# Patient Record
Sex: FEMALE | Race: WHITE | Employment: UNEMPLOYED | URBAN - METROPOLITAN AREA
[De-identification: names, ages, dates, MRNs, and addresses within clinical notes are randomized per-mention and may not be internally consistent; named-entity substitution may affect disease eponyms.]

---

## 2017-01-23 ENCOUNTER — HOSPITAL ENCOUNTER (OUTPATIENT)
Dept: SURGERY | Age: 52
Discharge: HOME | End: 2017-01-23
Attending: INTERNAL MEDICINE | Admitting: INTERNAL MEDICINE

## 2017-01-23 VITALS
TEMPERATURE: 97.7 F | SYSTOLIC BLOOD PRESSURE: 144 MMHG | DIASTOLIC BLOOD PRESSURE: 99 MMHG | WEIGHT: 155 LBS | OXYGEN SATURATION: 100 % | HEART RATE: 80 BPM | BODY MASS INDEX: 22.19 KG/M2 | HEIGHT: 70 IN | RESPIRATION RATE: 12 BRPM

## 2017-01-23 DIAGNOSIS — R13.14 DYSPHAGIA, PHARYNGOESOPHAGEAL PHASE: ICD-10-CM

## 2017-01-23 LAB
GLUCOSE BLD-MCNC: 185 MG/DL (ref 70–99)
GLUCOSE BLD-MCNC: 210 MG/DL (ref 70–99)
PERFORMED ON: ABNORMAL
PERFORMED ON: ABNORMAL
PREGNANCY, URINE: NEGATIVE

## 2017-01-23 RX ORDER — SODIUM CHLORIDE 0.9 % (FLUSH) 0.9 %
10 SYRINGE (ML) INJECTION EVERY 12 HOURS SCHEDULED
Status: DISCONTINUED | OUTPATIENT
Start: 2017-01-23 | End: 2017-01-24 | Stop reason: HOSPADM

## 2017-01-23 RX ORDER — LIDOCAINE HYDROCHLORIDE 10 MG/ML
1 INJECTION, SOLUTION EPIDURAL; INFILTRATION; INTRACAUDAL; PERINEURAL ONCE
Status: DISCONTINUED | OUTPATIENT
Start: 2017-01-23 | End: 2017-01-24 | Stop reason: HOSPADM

## 2017-01-23 RX ORDER — SODIUM CHLORIDE, SODIUM LACTATE, POTASSIUM CHLORIDE, CALCIUM CHLORIDE 600; 310; 30; 20 MG/100ML; MG/100ML; MG/100ML; MG/100ML
INJECTION, SOLUTION INTRAVENOUS CONTINUOUS
Status: DISCONTINUED | OUTPATIENT
Start: 2017-01-23 | End: 2017-01-24 | Stop reason: HOSPADM

## 2017-01-23 RX ORDER — SODIUM CHLORIDE, SODIUM LACTATE, POTASSIUM CHLORIDE, CALCIUM CHLORIDE 600; 310; 30; 20 MG/100ML; MG/100ML; MG/100ML; MG/100ML
1000 INJECTION, SOLUTION INTRAVENOUS ONCE
Status: DISCONTINUED | OUTPATIENT
Start: 2017-01-23 | End: 2017-01-24 | Stop reason: HOSPADM

## 2017-01-23 RX ORDER — LIDOCAINE HYDROCHLORIDE 10 MG/ML
1 INJECTION, SOLUTION EPIDURAL; INFILTRATION; INTRACAUDAL; PERINEURAL
Status: ACTIVE | OUTPATIENT
Start: 2017-01-23 | End: 2017-01-23

## 2017-01-23 RX ORDER — SODIUM CHLORIDE 0.9 % (FLUSH) 0.9 %
10 SYRINGE (ML) INJECTION PRN
Status: DISCONTINUED | OUTPATIENT
Start: 2017-01-23 | End: 2017-01-24 | Stop reason: HOSPADM

## 2017-01-23 ASSESSMENT — PAIN SCALES - GENERAL: PAINLEVEL_OUTOF10: 0

## 2017-01-23 ASSESSMENT — PAIN - FUNCTIONAL ASSESSMENT: PAIN_FUNCTIONAL_ASSESSMENT: 0-10

## 2017-03-14 ENCOUNTER — HOSPITAL ENCOUNTER (OUTPATIENT)
Dept: ULTRASOUND IMAGING | Age: 52
Discharge: HOME | End: 2017-03-14
Attending: INTERNAL MEDICINE | Admitting: INTERNAL MEDICINE

## 2017-03-14 DIAGNOSIS — B16.9 ACUTE VIRAL HEPATITIS B WITHOUT COMA AND WITHOUT DELTA AGENT: ICD-10-CM

## 2017-03-22 ENCOUNTER — HOSPITAL ENCOUNTER (OUTPATIENT)
Dept: ULTRASOUND IMAGING | Age: 52
Discharge: HOME | End: 2017-03-22
Attending: INTERNAL MEDICINE | Admitting: INTERNAL MEDICINE

## 2017-03-22 DIAGNOSIS — B16.9 ACUTE VIRAL HEPATITIS B WITHOUT COMA AND WITHOUT DELTA AGENT: ICD-10-CM

## 2017-03-22 DIAGNOSIS — B16.9 ACUTE HEPATITIS B: ICD-10-CM

## 2017-12-07 ENCOUNTER — HOSPITAL ENCOUNTER (OUTPATIENT)
Dept: OTHER | Age: 52
Discharge: HOME | End: 2017-12-07
Attending: INTERNAL MEDICINE | Admitting: INTERNAL MEDICINE

## 2017-12-07 DIAGNOSIS — R05.9 COUGH: ICD-10-CM

## 2017-12-07 LAB
A/G RATIO: 0.8 (ref 1.1–2.2)
ALBUMIN SERPL-MCNC: 3.8 G/DL (ref 3.4–5)
ALP BLD-CCNC: 105 U/L (ref 40–129)
ALT SERPL-CCNC: 112 U/L (ref 10–40)
ANION GAP SERPL CALCULATED.3IONS-SCNC: 15 MMOL/L (ref 3–16)
AST SERPL-CCNC: 98 U/L (ref 15–37)
BILIRUB SERPL-MCNC: 0.8 MG/DL (ref 0–1)
BUN BLDV-MCNC: 12 MG/DL (ref 7–20)
CALCIUM SERPL-MCNC: 9.6 MG/DL (ref 8.3–10.6)
CHLORIDE BLD-SCNC: 102 MMOL/L (ref 99–110)
CHOLESTEROL, FASTING: 192 MG/DL (ref 0–199)
CO2: 26 MMOL/L (ref 21–32)
CORTISOL TOTAL: 16.4 UG/DL
CREAT SERPL-MCNC: <0.5 MG/DL (ref 0.6–1.1)
GFR AFRICAN AMERICAN: >60
GFR NON-AFRICAN AMERICAN: >60
GLOBULIN: 4.5 G/DL
GLUCOSE FASTING: 114 MG/DL (ref 70–99)
HCT VFR BLD CALC: 43.7 % (ref 36–48)
HDLC SERPL-MCNC: 51 MG/DL (ref 40–60)
HEMOGLOBIN: 14.8 G/DL (ref 12–16)
LDL CHOLESTEROL CALCULATED: 122 MG/DL
MCH RBC QN AUTO: 32.1 PG (ref 26–34)
MCHC RBC AUTO-ENTMCNC: 34 G/DL (ref 31–36)
MCV RBC AUTO: 94.5 FL (ref 80–100)
PDW BLD-RTO: 13.8 % (ref 12.4–15.4)
PLATELET # BLD: 150 K/UL (ref 135–450)
PMV BLD AUTO: 10.4 FL (ref 5–10.5)
POTASSIUM SERPL-SCNC: 4.5 MMOL/L (ref 3.5–5.1)
RBC # BLD: 4.62 M/UL (ref 4–5.2)
SEDIMENTATION RATE, ERYTHROCYTE: 37 MM/HR (ref 0–30)
SODIUM BLD-SCNC: 143 MMOL/L (ref 136–145)
T3 FREE: 3 PG/ML (ref 2.3–4.2)
TOTAL PROTEIN: 8.3 G/DL (ref 6.4–8.2)
TRIGLYCERIDE, FASTING: 94 MG/DL (ref 0–150)
TSH SERPL DL<=0.05 MIU/L-ACNC: 2.97 UIU/ML (ref 0.27–4.2)
VITAMIN B-12: 888 PG/ML (ref 211–911)
VITAMIN D 25-HYDROXY: 35.7 NG/ML
VLDLC SERPL CALC-MCNC: 19 MG/DL
WBC # BLD: 6.1 K/UL (ref 4–11)

## 2017-12-08 LAB
ESTIMATED AVERAGE GLUCOSE: 197.3 MG/DL
HBA1C MFR BLD: 8.5 %

## 2019-01-07 ENCOUNTER — OFFICE VISIT (OUTPATIENT)
Dept: ORTHOPEDIC SURGERY | Age: 54
End: 2019-01-07
Payer: MEDICAID

## 2019-01-07 VITALS
HEIGHT: 70 IN | DIASTOLIC BLOOD PRESSURE: 84 MMHG | SYSTOLIC BLOOD PRESSURE: 128 MMHG | WEIGHT: 139.99 LBS | HEART RATE: 71 BPM | BODY MASS INDEX: 20.04 KG/M2

## 2019-01-07 DIAGNOSIS — M79.641 HAND PAIN, RIGHT: Primary | ICD-10-CM

## 2019-01-07 DIAGNOSIS — M65.311 TRIGGER FINGER OF RIGHT THUMB: ICD-10-CM

## 2019-01-07 PROCEDURE — G8427 DOCREV CUR MEDS BY ELIG CLIN: HCPCS | Performed by: ORTHOPAEDIC SURGERY

## 2019-01-07 PROCEDURE — 3017F COLORECTAL CA SCREEN DOC REV: CPT | Performed by: ORTHOPAEDIC SURGERY

## 2019-01-07 PROCEDURE — G8420 CALC BMI NORM PARAMETERS: HCPCS | Performed by: ORTHOPAEDIC SURGERY

## 2019-01-07 PROCEDURE — 99243 OFF/OP CNSLTJ NEW/EST LOW 30: CPT | Performed by: ORTHOPAEDIC SURGERY

## 2019-01-07 PROCEDURE — G8484 FLU IMMUNIZE NO ADMIN: HCPCS | Performed by: ORTHOPAEDIC SURGERY

## 2019-01-10 ENCOUNTER — TELEPHONE (OUTPATIENT)
Dept: ORTHOPEDIC SURGERY | Age: 54
End: 2019-01-10

## 2019-01-16 ENCOUNTER — ANESTHESIA EVENT (OUTPATIENT)
Dept: OPERATING ROOM | Age: 54
End: 2019-01-16
Payer: MEDICAID

## 2019-01-17 ENCOUNTER — ANESTHESIA (OUTPATIENT)
Dept: OPERATING ROOM | Age: 54
End: 2019-01-17
Payer: MEDICAID

## 2019-01-17 ENCOUNTER — HOSPITAL ENCOUNTER (OUTPATIENT)
Age: 54
Setting detail: OUTPATIENT SURGERY
Discharge: HOME | End: 2019-01-17
Attending: ORTHOPAEDIC SURGERY | Admitting: ORTHOPAEDIC SURGERY
Payer: MEDICAID

## 2019-01-17 VITALS
HEIGHT: 70 IN | SYSTOLIC BLOOD PRESSURE: 104 MMHG | BODY MASS INDEX: 20.33 KG/M2 | WEIGHT: 142 LBS | OXYGEN SATURATION: 99 % | TEMPERATURE: 97.4 F | RESPIRATION RATE: 15 BRPM | DIASTOLIC BLOOD PRESSURE: 73 MMHG | HEART RATE: 64 BPM

## 2019-01-17 VITALS
DIASTOLIC BLOOD PRESSURE: 55 MMHG | SYSTOLIC BLOOD PRESSURE: 89 MMHG | OXYGEN SATURATION: 99 % | TEMPERATURE: 98 F | RESPIRATION RATE: 1 BRPM

## 2019-01-17 DIAGNOSIS — M65.311 TRIGGER FINGER OF RIGHT THUMB: Primary | ICD-10-CM

## 2019-01-17 LAB
GLUCOSE BLD-MCNC: 210 MG/DL (ref 70–99)
PERFORMED ON: ABNORMAL

## 2019-01-17 PROCEDURE — 2500000003 HC RX 250 WO HCPCS: Performed by: NURSE ANESTHETIST, CERTIFIED REGISTERED

## 2019-01-17 PROCEDURE — 2580000003 HC RX 258: Performed by: NURSE ANESTHETIST, CERTIFIED REGISTERED

## 2019-01-17 PROCEDURE — 6360000002 HC RX W HCPCS: Performed by: ORTHOPAEDIC SURGERY

## 2019-01-17 PROCEDURE — 3600000012 HC SURGERY LEVEL 2 ADDTL 15MIN: Performed by: ORTHOPAEDIC SURGERY

## 2019-01-17 PROCEDURE — 2580000003 HC RX 258: Performed by: ANESTHESIOLOGY

## 2019-01-17 PROCEDURE — 2500000003 HC RX 250 WO HCPCS: Performed by: ANESTHESIOLOGY

## 2019-01-17 PROCEDURE — 2500000003 HC RX 250 WO HCPCS: Performed by: ORTHOPAEDIC SURGERY

## 2019-01-17 PROCEDURE — 3600000002 HC SURGERY LEVEL 2 BASE: Performed by: ORTHOPAEDIC SURGERY

## 2019-01-17 PROCEDURE — 3700000001 HC ADD 15 MINUTES (ANESTHESIA): Performed by: ORTHOPAEDIC SURGERY

## 2019-01-17 PROCEDURE — 7100000011 HC PHASE II RECOVERY - ADDTL 15 MIN: Performed by: ORTHOPAEDIC SURGERY

## 2019-01-17 PROCEDURE — 6360000002 HC RX W HCPCS: Performed by: NURSE ANESTHETIST, CERTIFIED REGISTERED

## 2019-01-17 PROCEDURE — 2709999900 HC NON-CHARGEABLE SUPPLY: Performed by: ORTHOPAEDIC SURGERY

## 2019-01-17 PROCEDURE — 3700000000 HC ANESTHESIA ATTENDED CARE: Performed by: ORTHOPAEDIC SURGERY

## 2019-01-17 PROCEDURE — 7100000010 HC PHASE II RECOVERY - FIRST 15 MIN: Performed by: ORTHOPAEDIC SURGERY

## 2019-01-17 PROCEDURE — 7100000001 HC PACU RECOVERY - ADDTL 15 MIN: Performed by: ORTHOPAEDIC SURGERY

## 2019-01-17 PROCEDURE — 7100000000 HC PACU RECOVERY - FIRST 15 MIN: Performed by: ORTHOPAEDIC SURGERY

## 2019-01-17 RX ORDER — FENTANYL CITRATE 50 UG/ML
25 INJECTION, SOLUTION INTRAMUSCULAR; INTRAVENOUS EVERY 5 MIN PRN
Status: DISCONTINUED | OUTPATIENT
Start: 2019-01-17 | End: 2019-01-17 | Stop reason: HOSPADM

## 2019-01-17 RX ORDER — FENTANYL CITRATE 50 UG/ML
INJECTION, SOLUTION INTRAMUSCULAR; INTRAVENOUS PRN
Status: DISCONTINUED | OUTPATIENT
Start: 2019-01-17 | End: 2019-01-17 | Stop reason: SDUPTHER

## 2019-01-17 RX ORDER — BUPIVACAINE HYDROCHLORIDE 2.5 MG/ML
INJECTION, SOLUTION EPIDURAL; INFILTRATION; INTRACAUDAL
Status: DISCONTINUED
Start: 2019-01-17 | End: 2019-01-17 | Stop reason: HOSPADM

## 2019-01-17 RX ORDER — ONDANSETRON 2 MG/ML
INJECTION INTRAMUSCULAR; INTRAVENOUS PRN
Status: DISCONTINUED | OUTPATIENT
Start: 2019-01-17 | End: 2019-01-17 | Stop reason: SDUPTHER

## 2019-01-17 RX ORDER — OXYCODONE HYDROCHLORIDE AND ACETAMINOPHEN 5; 325 MG/1; MG/1
1 TABLET ORAL
Status: DISCONTINUED | OUTPATIENT
Start: 2019-01-17 | End: 2019-01-17 | Stop reason: HOSPADM

## 2019-01-17 RX ORDER — PROMETHAZINE HYDROCHLORIDE 25 MG/ML
6.25 INJECTION, SOLUTION INTRAMUSCULAR; INTRAVENOUS
Status: DISCONTINUED | OUTPATIENT
Start: 2019-01-17 | End: 2019-01-17 | Stop reason: HOSPADM

## 2019-01-17 RX ORDER — PROPOFOL 10 MG/ML
INJECTION, EMULSION INTRAVENOUS PRN
Status: DISCONTINUED | OUTPATIENT
Start: 2019-01-17 | End: 2019-01-17 | Stop reason: SDUPTHER

## 2019-01-17 RX ORDER — LEVOTHYROXINE SODIUM 88 UG/1
88 TABLET ORAL DAILY
COMMUNITY
End: 2021-01-02

## 2019-01-17 RX ORDER — CEFAZOLIN SODIUM 2 G/50ML
2 SOLUTION INTRAVENOUS ONCE
Status: COMPLETED | OUTPATIENT
Start: 2019-01-17 | End: 2019-01-17

## 2019-01-17 RX ORDER — OXYCODONE HYDROCHLORIDE AND ACETAMINOPHEN 5; 325 MG/1; MG/1
1 TABLET ORAL EVERY 8 HOURS PRN
Qty: 21 TABLET | Refills: 0 | Status: SHIPPED | OUTPATIENT
Start: 2019-01-17 | End: 2019-01-24

## 2019-01-17 RX ORDER — LIDOCAINE HYDROCHLORIDE 10 MG/ML
INJECTION, SOLUTION EPIDURAL; INFILTRATION; INTRACAUDAL; PERINEURAL
Status: DISCONTINUED
Start: 2019-01-17 | End: 2019-01-17 | Stop reason: HOSPADM

## 2019-01-17 RX ORDER — HYDRALAZINE HYDROCHLORIDE 20 MG/ML
5 INJECTION INTRAMUSCULAR; INTRAVENOUS EVERY 10 MIN PRN
Status: DISCONTINUED | OUTPATIENT
Start: 2019-01-17 | End: 2019-01-17 | Stop reason: HOSPADM

## 2019-01-17 RX ORDER — ONDANSETRON 2 MG/ML
4 INJECTION INTRAMUSCULAR; INTRAVENOUS
Status: DISCONTINUED | OUTPATIENT
Start: 2019-01-17 | End: 2019-01-17 | Stop reason: HOSPADM

## 2019-01-17 RX ORDER — LIDOCAINE HYDROCHLORIDE 20 MG/ML
INJECTION, SOLUTION INFILTRATION; PERINEURAL PRN
Status: DISCONTINUED | OUTPATIENT
Start: 2019-01-17 | End: 2019-01-17 | Stop reason: SDUPTHER

## 2019-01-17 RX ORDER — SODIUM CHLORIDE, SODIUM LACTATE, POTASSIUM CHLORIDE, CALCIUM CHLORIDE 600; 310; 30; 20 MG/100ML; MG/100ML; MG/100ML; MG/100ML
INJECTION, SOLUTION INTRAVENOUS CONTINUOUS PRN
Status: DISCONTINUED | OUTPATIENT
Start: 2019-01-17 | End: 2019-01-17 | Stop reason: SDUPTHER

## 2019-01-17 RX ORDER — LABETALOL HYDROCHLORIDE 5 MG/ML
5 INJECTION, SOLUTION INTRAVENOUS EVERY 10 MIN PRN
Status: DISCONTINUED | OUTPATIENT
Start: 2019-01-17 | End: 2019-01-17 | Stop reason: HOSPADM

## 2019-01-17 RX ORDER — FENTANYL CITRATE 50 UG/ML
50 INJECTION, SOLUTION INTRAMUSCULAR; INTRAVENOUS EVERY 5 MIN PRN
Status: DISCONTINUED | OUTPATIENT
Start: 2019-01-17 | End: 2019-01-17 | Stop reason: HOSPADM

## 2019-01-17 RX ORDER — SODIUM CHLORIDE, SODIUM LACTATE, POTASSIUM CHLORIDE, CALCIUM CHLORIDE 600; 310; 30; 20 MG/100ML; MG/100ML; MG/100ML; MG/100ML
INJECTION, SOLUTION INTRAVENOUS CONTINUOUS
Status: DISCONTINUED | OUTPATIENT
Start: 2019-01-17 | End: 2019-01-17 | Stop reason: HOSPADM

## 2019-01-17 RX ADMIN — PROPOFOL 50 MG: 10 INJECTION, EMULSION INTRAVENOUS at 10:58

## 2019-01-17 RX ADMIN — SODIUM CHLORIDE, POTASSIUM CHLORIDE, SODIUM LACTATE AND CALCIUM CHLORIDE: 600; 310; 30; 20 INJECTION, SOLUTION INTRAVENOUS at 08:44

## 2019-01-17 RX ADMIN — CEFAZOLIN SODIUM 2 G: 2 SOLUTION INTRAVENOUS at 10:25

## 2019-01-17 RX ADMIN — LIDOCAINE HYDROCHLORIDE 0.1 ML: 10 INJECTION, SOLUTION EPIDURAL; INFILTRATION; INTRACAUDAL; PERINEURAL at 08:44

## 2019-01-17 RX ADMIN — FENTANYL CITRATE 50 MCG: 50 INJECTION INTRAMUSCULAR; INTRAVENOUS at 10:35

## 2019-01-17 RX ADMIN — PROPOFOL 80 MG: 10 INJECTION, EMULSION INTRAVENOUS at 10:43

## 2019-01-17 RX ADMIN — PROPOFOL 80 MG: 10 INJECTION, EMULSION INTRAVENOUS at 10:39

## 2019-01-17 RX ADMIN — SODIUM CHLORIDE, POTASSIUM CHLORIDE, SODIUM LACTATE AND CALCIUM CHLORIDE: 600; 310; 30; 20 INJECTION, SOLUTION INTRAVENOUS at 10:19

## 2019-01-17 RX ADMIN — CEFAZOLIN SODIUM 2 G: 2 SOLUTION INTRAVENOUS at 10:24

## 2019-01-17 RX ADMIN — PROPOFOL 80 MG: 10 INJECTION, EMULSION INTRAVENOUS at 10:35

## 2019-01-17 RX ADMIN — PROPOFOL 50 MG: 10 INJECTION, EMULSION INTRAVENOUS at 10:47

## 2019-01-17 RX ADMIN — PROPOFOL 50 MG: 10 INJECTION, EMULSION INTRAVENOUS at 10:52

## 2019-01-17 RX ADMIN — LIDOCAINE HYDROCHLORIDE 40 MG: 20 INJECTION, SOLUTION INFILTRATION; PERINEURAL at 10:35

## 2019-01-17 RX ADMIN — ONDANSETRON 4 MG: 2 INJECTION, SOLUTION INTRAMUSCULAR; INTRAVENOUS at 10:58

## 2019-01-17 ASSESSMENT — LIFESTYLE VARIABLES: SMOKING_STATUS: 1

## 2019-01-17 ASSESSMENT — PAIN - FUNCTIONAL ASSESSMENT
PAIN_FUNCTIONAL_ASSESSMENT: 0-10
PAIN_FUNCTIONAL_ASSESSMENT: PREVENTS OR INTERFERES SOME ACTIVE ACTIVITIES AND ADLS

## 2019-01-17 ASSESSMENT — PULMONARY FUNCTION TESTS
PIF_VALUE: 0

## 2019-01-17 ASSESSMENT — PAIN DESCRIPTION - DESCRIPTORS: DESCRIPTORS: ACHING

## 2019-01-17 ASSESSMENT — PAIN SCALES - GENERAL
PAINLEVEL_OUTOF10: 0
PAINLEVEL_OUTOF10: 0

## 2019-01-30 ENCOUNTER — OFFICE VISIT (OUTPATIENT)
Dept: ORTHOPEDIC SURGERY | Age: 54
End: 2019-01-30

## 2019-01-30 VITALS — WEIGHT: 141.98 LBS | BODY MASS INDEX: 20.33 KG/M2 | HEIGHT: 70 IN

## 2019-01-30 DIAGNOSIS — M65.311 TRIGGER FINGER OF RIGHT THUMB: Primary | ICD-10-CM

## 2019-01-30 PROCEDURE — 99024 POSTOP FOLLOW-UP VISIT: CPT | Performed by: ORTHOPAEDIC SURGERY

## 2019-02-18 ENCOUNTER — HOSPITAL ENCOUNTER (EMERGENCY)
Age: 54
Discharge: HOME | End: 2019-02-18
Payer: MEDICAID

## 2019-02-18 VITALS
BODY MASS INDEX: 21.33 KG/M2 | HEART RATE: 85 BPM | RESPIRATION RATE: 18 BRPM | WEIGHT: 149 LBS | OXYGEN SATURATION: 100 % | HEIGHT: 70 IN | TEMPERATURE: 97.7 F | SYSTOLIC BLOOD PRESSURE: 137 MMHG | DIASTOLIC BLOOD PRESSURE: 82 MMHG

## 2019-02-18 DIAGNOSIS — J01.00 ACUTE MAXILLARY SINUSITIS, RECURRENCE NOT SPECIFIED: Primary | ICD-10-CM

## 2019-02-18 DIAGNOSIS — K04.7 DENTAL INFECTION: ICD-10-CM

## 2019-02-18 PROCEDURE — 99282 EMERGENCY DEPT VISIT SF MDM: CPT

## 2019-02-18 PROCEDURE — 6370000000 HC RX 637 (ALT 250 FOR IP): Performed by: PHYSICIAN ASSISTANT

## 2019-02-18 RX ORDER — IBUPROFEN 800 MG/1
800 TABLET ORAL EVERY 8 HOURS PRN
Qty: 30 TABLET | Refills: 0 | Status: SHIPPED | OUTPATIENT
Start: 2019-02-18 | End: 2021-01-02

## 2019-02-18 RX ORDER — CLINDAMYCIN HYDROCHLORIDE 150 MG/1
300 CAPSULE ORAL ONCE
Status: COMPLETED | OUTPATIENT
Start: 2019-02-18 | End: 2019-02-18

## 2019-02-18 RX ORDER — CLINDAMYCIN HYDROCHLORIDE 300 MG/1
300 CAPSULE ORAL 3 TIMES DAILY
Qty: 21 CAPSULE | Refills: 0 | Status: SHIPPED | OUTPATIENT
Start: 2019-02-18 | End: 2019-02-25

## 2019-02-18 RX ADMIN — CLINDAMYCIN HYDROCHLORIDE 300 MG: 150 CAPSULE ORAL at 19:44

## 2019-02-18 ASSESSMENT — PAIN SCALES - GENERAL: PAINLEVEL_OUTOF10: 5

## 2019-02-18 ASSESSMENT — PAIN DESCRIPTION - PAIN TYPE: TYPE: ACUTE PAIN

## 2019-02-21 ASSESSMENT — ENCOUNTER SYMPTOMS
FACIAL SWELLING: 0
NAUSEA: 0
BACK PAIN: 0
EYE PAIN: 0
RHINORRHEA: 0
DIARRHEA: 0
SINUS PRESSURE: 1
SINUS PAIN: 1
EYE REDNESS: 0
SORE THROAT: 0
SHORTNESS OF BREATH: 0
COUGH: 0
CHEST TIGHTNESS: 0
CONSTIPATION: 0
ABDOMINAL PAIN: 0

## 2019-03-26 ENCOUNTER — HOSPITAL ENCOUNTER (OUTPATIENT)
Age: 54
Discharge: HOME | End: 2019-03-26
Payer: MEDICAID

## 2019-03-26 LAB
A/G RATIO: 0.5 (ref 1.1–2.2)
ALBUMIN SERPL-MCNC: 3.2 G/DL (ref 3.4–5)
ALP BLD-CCNC: 236 U/L (ref 40–129)
ALT SERPL-CCNC: 101 U/L (ref 10–40)
ANION GAP SERPL CALCULATED.3IONS-SCNC: 10 MMOL/L (ref 3–16)
AST SERPL-CCNC: 107 U/L (ref 15–37)
BILIRUB SERPL-MCNC: 0.6 MG/DL (ref 0–1)
BUN BLDV-MCNC: 10 MG/DL (ref 7–20)
CALCIUM SERPL-MCNC: 9.5 MG/DL (ref 8.3–10.6)
CHLORIDE BLD-SCNC: 99 MMOL/L (ref 99–110)
CHOLESTEROL, TOTAL: 204 MG/DL (ref 0–199)
CO2: 27 MMOL/L (ref 21–32)
CREAT SERPL-MCNC: <0.5 MG/DL (ref 0.6–1.1)
GFR AFRICAN AMERICAN: >60
GFR NON-AFRICAN AMERICAN: >60
GLOBULIN: 5.9 G/DL
GLUCOSE BLD-MCNC: 336 MG/DL (ref 70–99)
HCT VFR BLD CALC: 40.3 % (ref 36–48)
HDLC SERPL-MCNC: 62 MG/DL (ref 40–60)
HEMOGLOBIN: 13.7 G/DL (ref 12–16)
LDL CHOLESTEROL CALCULATED: 118 MG/DL
MCH RBC QN AUTO: 32 PG (ref 26–34)
MCHC RBC AUTO-ENTMCNC: 34.1 G/DL (ref 31–36)
MCV RBC AUTO: 94 FL (ref 80–100)
PDW BLD-RTO: 13.3 % (ref 12.4–15.4)
PLATELET # BLD: 135 K/UL (ref 135–450)
PMV BLD AUTO: 9.5 FL (ref 5–10.5)
POTASSIUM SERPL-SCNC: 4.8 MMOL/L (ref 3.5–5.1)
RBC # BLD: 4.29 M/UL (ref 4–5.2)
SODIUM BLD-SCNC: 136 MMOL/L (ref 136–145)
TOTAL PROTEIN: 9.1 G/DL (ref 6.4–8.2)
TRIGL SERPL-MCNC: 118 MG/DL (ref 0–150)
TSH SERPL DL<=0.05 MIU/L-ACNC: 6.59 UIU/ML (ref 0.27–4.2)
VITAMIN B-12: 695 PG/ML (ref 211–911)
VITAMIN D 25-HYDROXY: 23.5 NG/ML
VLDLC SERPL CALC-MCNC: 24 MG/DL
WBC # BLD: 5.6 K/UL (ref 4–11)

## 2019-03-26 PROCEDURE — 82607 VITAMIN B-12: CPT

## 2019-03-26 PROCEDURE — 36415 COLL VENOUS BLD VENIPUNCTURE: CPT

## 2019-03-26 PROCEDURE — 85027 COMPLETE CBC AUTOMATED: CPT

## 2019-03-26 PROCEDURE — 80061 LIPID PANEL: CPT

## 2019-03-26 PROCEDURE — 84443 ASSAY THYROID STIM HORMONE: CPT

## 2019-03-26 PROCEDURE — 82306 VITAMIN D 25 HYDROXY: CPT

## 2019-03-26 PROCEDURE — 83036 HEMOGLOBIN GLYCOSYLATED A1C: CPT

## 2019-03-26 PROCEDURE — 80053 COMPREHEN METABOLIC PANEL: CPT

## 2019-03-27 LAB
ESTIMATED AVERAGE GLUCOSE: 237.4 MG/DL
HBA1C MFR BLD: 9.9 %

## 2019-05-13 ENCOUNTER — HOSPITAL ENCOUNTER (EMERGENCY)
Age: 54
Discharge: HOME | End: 2019-05-13
Payer: MEDICAID

## 2019-05-13 VITALS
HEART RATE: 93 BPM | SYSTOLIC BLOOD PRESSURE: 132 MMHG | HEIGHT: 70 IN | RESPIRATION RATE: 12 BRPM | TEMPERATURE: 98.6 F | OXYGEN SATURATION: 100 % | DIASTOLIC BLOOD PRESSURE: 82 MMHG | WEIGHT: 150 LBS | BODY MASS INDEX: 21.47 KG/M2

## 2019-05-13 DIAGNOSIS — S46.911A STRAIN OF RIGHT SHOULDER, INITIAL ENCOUNTER: Primary | ICD-10-CM

## 2019-05-13 DIAGNOSIS — M25.511 ACUTE PAIN OF RIGHT SHOULDER: ICD-10-CM

## 2019-05-13 PROCEDURE — 6360000002 HC RX W HCPCS: Performed by: PHYSICIAN ASSISTANT

## 2019-05-13 PROCEDURE — 97110 THERAPEUTIC EXERCISES: CPT

## 2019-05-13 PROCEDURE — 96372 THER/PROPH/DIAG INJ SC/IM: CPT

## 2019-05-13 PROCEDURE — 99283 EMERGENCY DEPT VISIT LOW MDM: CPT

## 2019-05-13 PROCEDURE — 97162 PT EVAL MOD COMPLEX 30 MIN: CPT

## 2019-05-13 RX ORDER — NAPROXEN 500 MG/1
500 TABLET ORAL 2 TIMES DAILY
Qty: 20 TABLET | Refills: 0 | Status: SHIPPED | OUTPATIENT
Start: 2019-05-13 | End: 2021-01-02

## 2019-05-13 RX ORDER — KETOROLAC TROMETHAMINE 30 MG/ML
60 INJECTION, SOLUTION INTRAMUSCULAR; INTRAVENOUS ONCE
Status: COMPLETED | OUTPATIENT
Start: 2019-05-13 | End: 2019-05-13

## 2019-05-13 RX ORDER — GABAPENTIN 300 MG/1
300 CAPSULE ORAL PRN
COMMUNITY
End: 2021-01-02

## 2019-05-13 RX ORDER — LIDOCAINE 40 MG/G
CREAM TOPICAL
Qty: 15 G | Refills: 0 | Status: SHIPPED | OUTPATIENT
Start: 2019-05-13 | End: 2021-01-02

## 2019-05-13 RX ORDER — LIDOCAINE 50 MG/G
1 PATCH TOPICAL DAILY
Qty: 6 PATCH | Refills: 0 | Status: SHIPPED | OUTPATIENT
Start: 2019-05-13 | End: 2019-05-19

## 2019-05-13 RX ADMIN — KETOROLAC TROMETHAMINE 60 MG: 60 INJECTION, SOLUTION INTRAMUSCULAR at 17:10

## 2019-05-13 ASSESSMENT — PAIN DESCRIPTION - PAIN TYPE: TYPE: ACUTE PAIN

## 2019-05-13 ASSESSMENT — PAIN SCALES - GENERAL
PAINLEVEL_OUTOF10: 4
PAINLEVEL_OUTOF10: 5

## 2019-05-13 ASSESSMENT — PAIN DESCRIPTION - ORIENTATION: ORIENTATION: RIGHT

## 2019-05-13 ASSESSMENT — PAIN DESCRIPTION - LOCATION: LOCATION: SHOULDER

## 2019-05-13 NOTE — ED NOTES
Reviewed discharge instructions with pt, verbalized understanding,  Denies questions at this time. Ambulated off unit without assistance.       Alexander Mata RN  05/13/19 5877

## 2019-05-13 NOTE — ED NOTES
[]   L   []   Scapular winging  []   Other:       Palpation/Tenderness/Visual Inspection      Patient reported tenderness with palpation  [x]   Yes   []   No   []   NT  Location:   R proximal bicep tendon, R teres minor, R infraspinatous  PT notes warmth  []   Yes   [x]   No   []   NT  Location:   PT notes increased muscle tone   []   Yes   [x]   No   []   NT  Location:   Ligament tenderness/provocation:   []   Yes   []   No   [x]   NT  Location:      Range of Motion/Strength Testing/Myotomes    [x] All ROM and strength WNL except as marked below  *denotes pain  Range Tested AROM PROM Resisted/Myotomes    Left (or  neutral)  Right Left Right Left (or neutral) Right   Cervical Flex (C1-2)         Cervical Ext         Cervical SB (C3)         Cervical Rot         Shoulder Shrug (C4)         Shoulder Flex  75  120  2/5   Shoulder Ext  30    4-/5   Shoulder Abd (C5)  75  90  2/5   Shoulder Add         Shoulder IR  To top of hip bone  45 in 45 deg abd  2/5   Shoulder ER   Unable to reach back of head  45 in 45 deg abd  2/5   Elbow flex (C6)         Elbow ext (C7)         Wrist ext (C6)         Wrist flex (C7)         Supination          Pronation         Thumb Ext (C8)         Intrinsics (T1)             Scapula Strength     [x] All strength WNL except as marked below   Scapula Left Right Comments   Upper Trapezius  weakness noted    Middle Trapezius  \"    Lower Trapezius  \"    Rhomboid  \"    Serratus Anterior    \"    Latissimus Dorsi  \"        UE Special Tests    Test Right Left   Painful Arc [x]  Positive []   Neg []  Positive []   Neg   Empty Can [x]  Positive []   Neg []  Positive []   Neg   Drop Arm []  Positive [x]   Neg []  Positive []   Neg   Resisted ER []  Positive []   Neg []  Positive []   Neg       TREATMENT  Educated on anatomy, physiology, and biomechanics of R shoulder and RTC. Pt verbalized understanding and all of patient's questions answered to satisfaction. Business card and HEP issued.      Manual tx: none    Therex:  Instructed in HEP with handout given:  RUCHI Gaines shoulder flex, abd, IR/ER, table slides    Equipment:  none       Pt response to treatment:  Tolerated treatment well with no adverse reactions noted. ASSESSMENT  Pt presenting to ED with c/o R shoulder pain. Through evaluation and examination, identified limited ROM, limited strength, poor scapulohumeral rhythm, and decreased functional status with use of R shoulder. Patient was instructed in HEP with handout given. PT recommending referral to ortho and outpatient PT treatment. Discussed findings and recommendations with referring provider. PLAN OF CARE    One time visit in the ED. Thank you for the referral of this patient.      Timed Code Treatment Minutes:  15  minutes   Total Treatment Time:  35 minutes    Pb Berrios PT [id number]

## 2019-05-15 NOTE — ED PROVIDER NOTES
CHIEF COMPLAINT  Shoulder Pain (right shoulder pain for 1 year, \"I pulled a bunch of weeds while working in the garden and I cant even lift up my shoulder\")      HISTORY OF PRESENT ILLNESS  Jaquelin Saul is a 48 y.o. female who presents to the ED for evaluation of 2 years of chronic shoulder pain, worsened over the past day, after pulling weeds int he garden. No trauma or injury. +dominant arm. 10/10 throbbing pain. Worse with use. No other complaints, modifying factors or associated symptoms. Nursing notes reviewed.    Past Medical History:   Diagnosis Date    Depression     Diabetes mellitus ([facility] [address]     Hepatitis B surface antigen positive 11/22/2016    Neuropathy     Thyroid disease     Type II or unspecified type diabetes mellitus without mention of complication, not stated as uncontrolled      Past Surgical History:   Procedure Laterality Date    COLONOSCOPY  01/23/2017    polyp removal    FINGER TRIGGER RELEASE Right 1/17/2019    RIGHT THUMB TRIGGER DIGIT RELEASE performed by Jhonatan Aguilar MD at [facility]  age 10   [facility] UPPER GASTROINTESTINAL ENDOSCOPY  12/08/2016    UPPER GASTROINTESTINAL ENDOSCOPY  01/23/2017     Family History   Problem Relation Age of Onset    Diabetes Mother     High Blood Pressure Mother     Cancer Father     Diabetes Father     Lupus Sister     Cancer Sister     Heart Disease Brother      Social History     Socioeconomic History    Marital status: Single     Spouse name: Not on file    Number of children: Not on file    Years of education: Not on file    Highest education level: Not on file   Occupational History    Not on file   Social Needs    Financial resource strain: Not on file    Food insecurity:     Worry: Not on file     Inability: Not on file    Transportation needs:     Medical: Not on file     Non-medical: Not on file   Tobacco Use    Smoking status: Current Every Day Smoker Packs/day: 1.00     Types: Cigarettes    Smokeless tobacco: Never Used   Substance and Sexual Activity    Alcohol use: Yes     Comment: 5/mth    Drug use: Yes     Frequency: 3.0 times per week     Types: Marijuana     Comment: past week     Sexual activity: Yes     Partners: Male   Lifestyle    Physical activity:     Days per week: Not on file     Minutes per session: Not on file    Stress: Not on file   Relationships    Social connections:     Talks on phone: Not on file     Gets together: Not on file     Attends Religion service: Not on file     Active member of club or organization: Not on file     Attends meetings of clubs or organizations: Not on file     Relationship status: Not on file    Intimate partner violence:     Fear of current or ex partner: Not on file     Emotionally abused: Not on file     Physically abused: Not on file     Forced sexual activity: Not on file   Other Topics Concern    Not on file   Social History Narrative    ** Merged History Encounter **          No current facility-administered medications for this encounter. Current Outpatient Medications   Medication Sig Dispense Refill    gabapentin (NEURONTIN) 300 MG capsule Take 300 mg by mouth as needed.  naproxen (NAPROSYN) 500 MG tablet Take 1 tablet by mouth 2 times daily for 20 doses 20 tablet 0    lidocaine (LIDODERM) 5 % Place 1 patch onto the skin daily for 6 doses 12 hours on, 12 hours off. 6 patch 0    lidocaine (LMX) 4 % cream Apply topically as needed.  15 g 0    ibuprofen (ADVIL;MOTRIN) 800 MG tablet Take 1 tablet by mouth every 8 hours as needed for Pain 30 tablet 0    insulin glargine (BASAGLAR KWIKPEN) 100 UNIT/ML injection pen Inject 50 Units into the skin daily Pt states she takes in AM       levothyroxine (SYNTHROID) 88 MCG tablet Take 88 mcg by mouth Daily       Allergies   Allergen Reactions    Levaquin [Levofloxacin]      Tendons locked up    Penicillins Hives    Pcn [Penicillins] Rash had any recent trauma. Patient is advised to return to the ED for any new or worsening symptoms. Patient isto follow up with PCP in 2 Days. Patient was given scripts for the following medications. I counseled patient how to take these medications. Discharge Medication List as of 5/13/2019  4:48 PM      START taking these medications    Details   naproxen (NAPROSYN) 500 MG tablet Take 1 tablet by mouth 2 times daily for 20 doses, Disp-20 tablet, R-0Print      lidocaine (LIDODERM) 5 % Place 1 patch onto the skin daily for 6 doses 12 hours on, 12 hours off., Disp-6 patch, R-0Print      lidocaine (LMX) 4 % cream Apply topically as needed. , Disp-15 g, R-0, Print               No results found for this visit on 05/13/19. I estimate there is LOW risk for ACUTE CORONARY SYNDROME,INTRACRANIAL HEMORRHAGE, MALIGNANT DYSRHYTHMIA, MENINGITIS, PNEUMONIA, PULMONARY EMBOLISM, SEPSIS, SUBARACHNOID HEMORRHAGE, SUBDURAL HEMATOMA, STROKE, or URINARY TRACT INFECTION, thus I consider the discharge dispositionreasonable. Rosita Muñoz and I have discussed the diagnosis and risks, and we agree with discharging home to follow-up with their primary doctor. We also discussed returning to the Emergency Department immediately if new orworsening symptoms occur. We have discussed the symptoms which are most concerning (e.g., changing or worsening pain, weakness, vomiting, fever) that necessitate immediate return. CLINICAL IMPRESSION  1. Strain of right shoulder, initial encounter    2. Acute pain of right shoulder        Blood pressure 132/82, pulse 93, temperature 98.6 °F (37 °C), temperature source Tympanic, resp. rate 12, height 5' 10\" (1.778 m), weight 150 lb (68 kg), last menstrual period 12/06/2016, SpO2 100 %, not currently breastfeeding. DISPOSITION  Patient was discharged to home in good condition.         Gasper Conte PA-C  05/18/19 5468

## 2019-05-18 ASSESSMENT — ENCOUNTER SYMPTOMS
RESPIRATORY NEGATIVE: 1
SHORTNESS OF BREATH: 0
GASTROINTESTINAL NEGATIVE: 1
BACK PAIN: 0

## 2020-05-28 ENCOUNTER — HOSPITAL ENCOUNTER (EMERGENCY)
Age: 55
Discharge: HOME | End: 2020-05-29
Attending: EMERGENCY MEDICINE
Payer: MEDICAID

## 2020-05-28 LAB
A/G RATIO: 0.7 (ref 1.1–2.2)
ALBUMIN SERPL-MCNC: 3.5 G/DL (ref 3.4–5)
ALP BLD-CCNC: 164 U/L (ref 40–129)
ALT SERPL-CCNC: 129 U/L (ref 10–40)
AMPHETAMINE SCREEN, URINE: ABNORMAL
ANION GAP SERPL CALCULATED.3IONS-SCNC: 9 MMOL/L (ref 3–16)
AST SERPL-CCNC: 114 U/L (ref 15–37)
BACTERIA: ABNORMAL /HPF
BARBITURATE SCREEN URINE: ABNORMAL
BASOPHILS ABSOLUTE: 0 K/UL (ref 0–0.2)
BASOPHILS RELATIVE PERCENT: 0.9 %
BENZODIAZEPINE SCREEN, URINE: ABNORMAL
BILIRUB SERPL-MCNC: 0.8 MG/DL (ref 0–1)
BILIRUBIN URINE: ABNORMAL
BLOOD, URINE: ABNORMAL
BUN BLDV-MCNC: 12 MG/DL (ref 7–20)
CALCIUM SERPL-MCNC: 9.4 MG/DL (ref 8.3–10.6)
CANNABINOID SCREEN URINE: POSITIVE
CHLORIDE BLD-SCNC: 97 MMOL/L (ref 99–110)
CLARITY: CLEAR
CO2: 26 MMOL/L (ref 21–32)
COCAINE METABOLITE SCREEN URINE: ABNORMAL
COLOR: YELLOW
CREAT SERPL-MCNC: 0.6 MG/DL (ref 0.6–1.1)
EOSINOPHILS ABSOLUTE: 0.1 K/UL (ref 0–0.6)
EOSINOPHILS RELATIVE PERCENT: 2.3 %
ETHANOL: NORMAL MG/DL (ref 0–0.08)
GFR AFRICAN AMERICAN: >60
GFR NON-AFRICAN AMERICAN: >60
GLOBULIN: 5.2 G/DL
GLUCOSE BLD-MCNC: 310 MG/DL (ref 70–99)
GLUCOSE URINE: 500 MG/DL
HCT VFR BLD CALC: 43.2 % (ref 36–48)
HEMOGLOBIN: 14.7 G/DL (ref 12–16)
KETONES, URINE: NEGATIVE MG/DL
LEUKOCYTE ESTERASE, URINE: NEGATIVE
LYMPHOCYTES ABSOLUTE: 1.5 K/UL (ref 1–5.1)
LYMPHOCYTES RELATIVE PERCENT: 29.6 %
Lab: ABNORMAL
MCH RBC QN AUTO: 31.7 PG (ref 26–34)
MCHC RBC AUTO-ENTMCNC: 34 G/DL (ref 31–36)
MCV RBC AUTO: 93.4 FL (ref 80–100)
METHADONE SCREEN, URINE: ABNORMAL
MICROSCOPIC EXAMINATION: YES
MONOCYTES ABSOLUTE: 0.5 K/UL (ref 0–1.3)
MONOCYTES RELATIVE PERCENT: 10.4 %
MUCUS: ABNORMAL /LPF
NEUTROPHILS ABSOLUTE: 2.8 K/UL (ref 1.7–7.7)
NEUTROPHILS RELATIVE PERCENT: 56.8 %
NITRITE, URINE: NEGATIVE
OPIATE SCREEN URINE: ABNORMAL
OXYCODONE URINE: ABNORMAL
PDW BLD-RTO: 13.3 % (ref 12.4–15.4)
PH UA: 5.5
PH UA: 5.5 (ref 5–8)
PHENCYCLIDINE SCREEN URINE: ABNORMAL
PLATELET # BLD: 120 K/UL (ref 135–450)
PMV BLD AUTO: 9.1 FL (ref 5–10.5)
POTASSIUM REFLEX MAGNESIUM: 4.1 MMOL/L (ref 3.5–5.1)
PROPOXYPHENE SCREEN: ABNORMAL
PROTEIN UA: NEGATIVE MG/DL
RBC # BLD: 4.63 M/UL (ref 4–5.2)
RBC UA: ABNORMAL /HPF (ref 0–4)
SODIUM BLD-SCNC: 132 MMOL/L (ref 136–145)
SPECIFIC GRAVITY UA: >=1.03 (ref 1–1.03)
TOTAL PROTEIN: 8.7 G/DL (ref 6.4–8.2)
URINE REFLEX TO CULTURE: ABNORMAL
URINE TYPE: ABNORMAL
UROBILINOGEN, URINE: 1 E.U./DL
WBC # BLD: 5 K/UL (ref 4–11)
WBC UA: ABNORMAL /HPF (ref 0–5)

## 2020-05-28 PROCEDURE — 81001 URINALYSIS AUTO W/SCOPE: CPT

## 2020-05-28 PROCEDURE — 99284 EMERGENCY DEPT VISIT MOD MDM: CPT

## 2020-05-28 PROCEDURE — 85025 COMPLETE CBC W/AUTO DIFF WBC: CPT

## 2020-05-28 PROCEDURE — G0480 DRUG TEST DEF 1-7 CLASSES: HCPCS

## 2020-05-28 PROCEDURE — 80307 DRUG TEST PRSMV CHEM ANLYZR: CPT

## 2020-05-28 PROCEDURE — 80053 COMPREHEN METABOLIC PANEL: CPT

## 2020-05-29 VITALS
RESPIRATION RATE: 16 BRPM | TEMPERATURE: 97 F | WEIGHT: 140 LBS | HEIGHT: 70 IN | OXYGEN SATURATION: 99 % | DIASTOLIC BLOOD PRESSURE: 87 MMHG | SYSTOLIC BLOOD PRESSURE: 153 MMHG | BODY MASS INDEX: 20.04 KG/M2 | HEART RATE: 85 BPM

## 2020-05-29 RX ORDER — CLINDAMYCIN HYDROCHLORIDE 150 MG/1
450 CAPSULE ORAL 3 TIMES DAILY
Qty: 90 CAPSULE | Refills: 0 | Status: SHIPPED | OUTPATIENT
Start: 2020-05-29 | End: 2020-06-08

## 2020-05-29 ASSESSMENT — PAIN SCALES - GENERAL: PAINLEVEL_OUTOF10: 3

## 2020-05-29 ASSESSMENT — PATIENT HEALTH QUESTIONNAIRE - PHQ9: SUM OF ALL RESPONSES TO PHQ QUESTIONS 1-9: 25

## 2020-05-29 ASSESSMENT — PAIN DESCRIPTION - PAIN TYPE: TYPE: NEUROPATHIC PAIN
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
## 2020-05-29 NOTE — ED PROVIDER NOTES
12/08/2016    UPPER GASTROINTESTINAL ENDOSCOPY  01/23/2017     Family History   Problem Relation Age of Onset    Diabetes Mother     High Blood Pressure Mother     Cancer Father     Diabetes Father     Lupus Sister     Cancer Sister     Heart Disease Brother      Social History     Socioeconomic History    Marital status: Single     Spouse name: Not on file    Number of children: Not on file    Years of education: Not on file    Highest education level: Not on file   Occupational History    Not on file   Social Needs    Financial resource strain: Not on file    Food insecurity     Worry: Not on file     Inability: Not on file    Transportation needs     Medical: Not on file     Non-medical: Not on file   Tobacco Use    Smoking status: Current Every Day Smoker     Packs/day: 1.00     Types: Cigarettes    Smokeless tobacco: Never Used   Substance and Sexual Activity    Alcohol use: Yes     Comment: 5/mth    Drug use: Yes     Frequency: 3.0 times per week     Types: Marijuana     Comment: past week     Sexual activity: Yes     Partners: Male   Lifestyle    Physical activity     Days per week: Not on file     Minutes per session: Not on file    Stress: Not on file   Relationships    Social connections     Talks on phone: Not on file     Gets together: Not on file     Attends Yazdanism service: Not on file     Active member of club or organization: Not on file     Attends meetings of clubs or organizations: Not on file     Relationship status: Not on file    Intimate partner violence     Fear of current or ex partner: Not on file     Emotionally abused: Not on file     Physically abused: Not on file     Forced sexual activity: Not on file   Other Topics Concern    Not on file   Social History Narrative    ** Merged History Encounter **          No current facility-administered medications for this encounter.       Current Outpatient Medications   Medication Sig Dispense Refill    clindamycin (CLEOCIN) 150 MG capsule Take 3 capsules by mouth 3 times daily for 10 days 90 capsule 0    gabapentin (NEURONTIN) 300 MG capsule Take 300 mg by mouth as needed.  naproxen (NAPROSYN) 500 MG tablet Take 1 tablet by mouth 2 times daily for 20 doses 20 tablet 0    lidocaine (LMX) 4 % cream Apply topically as needed. 15 g 0    ibuprofen (ADVIL;MOTRIN) 800 MG tablet Take 1 tablet by mouth every 8 hours as needed for Pain 30 tablet 0    insulin glargine (BASAGLAR KWIKPEN) 100 UNIT/ML injection pen Inject 50 Units into the skin daily Pt states she takes in AM       levothyroxine (SYNTHROID) 88 MCG tablet Take 88 mcg by mouth Daily       Allergies   Allergen Reactions    Levaquin [Levofloxacin]      Tendons locked up    Penicillins Hives    Pcn [Penicillins] Rash       REVIEW OF SYSTEMS  10 systems reviewed, pertinent positives per HPI otherwise noted to be negative. PHYSICAL EXAM  BP (!) 153/87   Pulse 85   Temp 97 °F (36.1 °C) (Oral)   Resp 16   Ht 5' 10\" (1.778 m)   Wt 140 lb (63.5 kg)   LMP 12/06/2016   SpO2 99%   BMI 20.09 kg/m²    GENERAL APPEARANCE: Awake and alert. Cooperative. No acute distress. HENT: Normocephalic. Atraumatic. Mucous membranes are moist.  No drooling or stridor. NECK: Supple. EYES: PERRL. EOM's grossly intact. HEART/CHEST: RRR. No murmurs. LUNGS: Respirations unlabored. CTAB. Good air exchange. Speaking comfortably in full sentences. ABDOMEN: No tenderness. Soft. Non-distended. No masses. No organomegaly. No guarding or rebound. MUSCULOSKELETAL: No extremity edema. Compartments soft. No deformity. No tenderness in the extremities. All extremities neurovascularly intact. SKIN: Warm and dry. No acute rashes. NEUROLOGICAL: Alert and oriented. CN's 2-12 intact. No gross facial drooping. Strength 5/5, sensation intact. No gross focal deficits. PSYCHIATRIC: Depressed mood. Tearful.   Denies current suicidal ideation although has had vague thoughts about not

## 2021-01-02 ENCOUNTER — HOSPITAL ENCOUNTER (INPATIENT)
Age: 56
LOS: 5 days | Discharge: HOME | DRG: 751 | End: 2021-01-07
Attending: STUDENT IN AN ORGANIZED HEALTH CARE EDUCATION/TRAINING PROGRAM | Admitting: PSYCHIATRY & NEUROLOGY
Payer: MEDICAID

## 2021-01-02 DIAGNOSIS — R31.9 HEMATURIA, UNSPECIFIED TYPE: ICD-10-CM

## 2021-01-02 DIAGNOSIS — E11.65 TYPE 2 DIABETES MELLITUS WITH HYPERGLYCEMIA, WITHOUT LONG-TERM CURRENT USE OF INSULIN (HCC): ICD-10-CM

## 2021-01-02 DIAGNOSIS — R45.851 SUICIDAL IDEATION: Primary | ICD-10-CM

## 2021-01-02 PROBLEM — F32.9 MAJOR DEPRESSIVE DISORDER WITHOUT PSYCHOTIC FEATURES: Status: ACTIVE | Noted: 2021-01-02

## 2021-01-02 LAB
A/G RATIO: 0.8 (ref 1.1–2.2)
ACETAMINOPHEN LEVEL: <5 UG/ML (ref 10–30)
ALBUMIN SERPL-MCNC: 3.8 G/DL (ref 3.4–5)
ALP BLD-CCNC: 149 U/L (ref 40–129)
ALT SERPL-CCNC: 36 U/L (ref 10–40)
AMPHETAMINE SCREEN, URINE: ABNORMAL
ANION GAP SERPL CALCULATED.3IONS-SCNC: 12 MMOL/L (ref 3–16)
AST SERPL-CCNC: 46 U/L (ref 15–37)
BACTERIA: ABNORMAL /HPF
BARBITURATE SCREEN URINE: ABNORMAL
BASE EXCESS VENOUS: -2.3 MMOL/L (ref -3–3)
BASOPHILS ABSOLUTE: 0.1 K/UL (ref 0–0.2)
BASOPHILS RELATIVE PERCENT: 1.3 %
BENZODIAZEPINE SCREEN, URINE: ABNORMAL
BILIRUB SERPL-MCNC: 1.1 MG/DL (ref 0–1)
BILIRUBIN URINE: NEGATIVE
BLOOD, URINE: ABNORMAL
BUN BLDV-MCNC: 11 MG/DL (ref 7–20)
CALCIUM SERPL-MCNC: 9.4 MG/DL (ref 8.3–10.6)
CANNABINOID SCREEN URINE: POSITIVE
CARBOXYHEMOGLOBIN: 7.9 % (ref 0–1.5)
CHLORIDE BLD-SCNC: 95 MMOL/L (ref 99–110)
CHP ED QC CHECK: NORMAL
CHP ED QC CHECK: YES
CLARITY: CLEAR
CO2: 22 MMOL/L (ref 21–32)
COCAINE METABOLITE SCREEN URINE: ABNORMAL
COLOR: YELLOW
CREAT SERPL-MCNC: 0.6 MG/DL (ref 0.6–1.1)
EKG ATRIAL RATE: 109 BPM
EKG DIAGNOSIS: NORMAL
EKG P AXIS: 75 DEGREES
EKG P-R INTERVAL: 164 MS
EKG Q-T INTERVAL: 348 MS
EKG QRS DURATION: 100 MS
EKG QTC CALCULATION (BAZETT): 468 MS
EKG R AXIS: 78 DEGREES
EKG T AXIS: 45 DEGREES
EKG VENTRICULAR RATE: 109 BPM
EOSINOPHILS ABSOLUTE: 0 K/UL (ref 0–0.6)
EOSINOPHILS RELATIVE PERCENT: 0.6 %
EPITHELIAL CELLS, UA: ABNORMAL /HPF (ref 0–5)
ETHANOL: NORMAL MG/DL (ref 0–0.08)
FINE CASTS, UA: ABNORMAL /LPF (ref 0–2)
GFR AFRICAN AMERICAN: >60
GFR NON-AFRICAN AMERICAN: >60
GLOBULIN: 4.8 G/DL
GLUCOSE BLD-MCNC: 126 MG/DL (ref 70–99)
GLUCOSE BLD-MCNC: 307 MG/DL
GLUCOSE BLD-MCNC: 307 MG/DL (ref 70–99)
GLUCOSE BLD-MCNC: 341 MG/DL
GLUCOSE BLD-MCNC: 341 MG/DL (ref 70–99)
GLUCOSE BLD-MCNC: 394 MG/DL
GLUCOSE BLD-MCNC: 394 MG/DL (ref 70–99)
GLUCOSE BLD-MCNC: 418 MG/DL (ref 70–99)
GLUCOSE URINE: >=1000 MG/DL
HCG QUALITATIVE: NEGATIVE
HCO3 VENOUS: 22.8 MMOL/L (ref 23–29)
HCT VFR BLD CALC: 44.4 % (ref 36–48)
HEMOGLOBIN: 15.2 G/DL (ref 12–16)
HYALINE CASTS: ABNORMAL /LPF (ref 0–2)
KETONES, URINE: 15 MG/DL
LEUKOCYTE ESTERASE, URINE: NEGATIVE
LYMPHOCYTES ABSOLUTE: 1 K/UL (ref 1–5.1)
LYMPHOCYTES RELATIVE PERCENT: 15.1 %
Lab: ABNORMAL
MCH RBC QN AUTO: 31.6 PG (ref 26–34)
MCHC RBC AUTO-ENTMCNC: 34.3 G/DL (ref 31–36)
MCV RBC AUTO: 92.2 FL (ref 80–100)
METHADONE SCREEN, URINE: ABNORMAL
METHEMOGLOBIN VENOUS: 0.3 %
MICROSCOPIC EXAMINATION: YES
MONOCYTES ABSOLUTE: 0.4 K/UL (ref 0–1.3)
MONOCYTES RELATIVE PERCENT: 6.4 %
MUCUS: ABNORMAL /LPF
NEUTROPHILS ABSOLUTE: 5.2 K/UL (ref 1.7–7.7)
NEUTROPHILS RELATIVE PERCENT: 76.6 %
NITRITE, URINE: NEGATIVE
O2 CONTENT, VEN: 18 VOL %
O2 SAT, VEN: 81 %
O2 THERAPY: ABNORMAL
OPIATE SCREEN URINE: ABNORMAL
OXYCODONE URINE: ABNORMAL
PCO2, VEN: 40.3 MMHG (ref 40–50)
PDW BLD-RTO: 13.2 % (ref 12.4–15.4)
PERFORMED ON: ABNORMAL
PH UA: 5.5
PH UA: 5.5 (ref 5–8)
PH VENOUS: 7.37 (ref 7.35–7.45)
PHENCYCLIDINE SCREEN URINE: ABNORMAL
PLATELET # BLD: 141 K/UL (ref 135–450)
PMV BLD AUTO: 8.8 FL (ref 5–10.5)
PO2, VEN: 46.5 MMHG (ref 25–40)
POTASSIUM REFLEX MAGNESIUM: 3.8 MMOL/L (ref 3.5–5.1)
PROPOXYPHENE SCREEN: ABNORMAL
PROTEIN UA: 100 MG/DL
RBC # BLD: 4.82 M/UL (ref 4–5.2)
RBC UA: ABNORMAL /HPF (ref 0–4)
SALICYLATE, SERUM: <0.3 MG/DL (ref 15–30)
SARS-COV-2, NAAT: NOT DETECTED
SODIUM BLD-SCNC: 129 MMOL/L (ref 136–145)
SPECIFIC GRAVITY UA: 1.02 (ref 1–1.03)
TCO2 CALC VENOUS: 24 MMOL/L
TOTAL PROTEIN: 8.6 G/DL (ref 6.4–8.2)
TSH SERPL DL<=0.05 MIU/L-ACNC: 6.39 UIU/ML (ref 0.27–4.2)
URINE REFLEX TO CULTURE: ABNORMAL
URINE TYPE: ABNORMAL
UROBILINOGEN, URINE: 1 E.U./DL
WBC # BLD: 6.8 K/UL (ref 4–11)
WBC UA: ABNORMAL /HPF (ref 0–5)

## 2021-01-02 PROCEDURE — 96372 THER/PROPH/DIAG INJ SC/IM: CPT

## 2021-01-02 PROCEDURE — 2580000003 HC RX 258: Performed by: STUDENT IN AN ORGANIZED HEALTH CARE EDUCATION/TRAINING PROGRAM

## 2021-01-02 PROCEDURE — 84439 ASSAY OF FREE THYROXINE: CPT

## 2021-01-02 PROCEDURE — 84443 ASSAY THYROID STIM HORMONE: CPT

## 2021-01-02 PROCEDURE — 84703 CHORIONIC GONADOTROPIN ASSAY: CPT

## 2021-01-02 PROCEDURE — 6370000000 HC RX 637 (ALT 250 FOR IP): Performed by: STUDENT IN AN ORGANIZED HEALTH CARE EDUCATION/TRAINING PROGRAM

## 2021-01-02 PROCEDURE — 36415 COLL VENOUS BLD VENIPUNCTURE: CPT

## 2021-01-02 PROCEDURE — 80307 DRUG TEST PRSMV CHEM ANLYZR: CPT

## 2021-01-02 PROCEDURE — 6370000000 HC RX 637 (ALT 250 FOR IP): Performed by: PSYCHIATRY & NEUROLOGY

## 2021-01-02 PROCEDURE — G0480 DRUG TEST DEF 1-7 CLASSES: HCPCS

## 2021-01-02 PROCEDURE — 93010 ELECTROCARDIOGRAM REPORT: CPT | Performed by: INTERNAL MEDICINE

## 2021-01-02 PROCEDURE — 83036 HEMOGLOBIN GLYCOSYLATED A1C: CPT

## 2021-01-02 PROCEDURE — 80053 COMPREHEN METABOLIC PANEL: CPT

## 2021-01-02 PROCEDURE — 1240000000 HC EMOTIONAL WELLNESS R&B

## 2021-01-02 PROCEDURE — 82803 BLOOD GASES ANY COMBINATION: CPT

## 2021-01-02 PROCEDURE — 93005 ELECTROCARDIOGRAM TRACING: CPT | Performed by: STUDENT IN AN ORGANIZED HEALTH CARE EDUCATION/TRAINING PROGRAM

## 2021-01-02 PROCEDURE — 81001 URINALYSIS AUTO W/SCOPE: CPT

## 2021-01-02 PROCEDURE — 99284 EMERGENCY DEPT VISIT MOD MDM: CPT

## 2021-01-02 PROCEDURE — U0002 COVID-19 LAB TEST NON-CDC: HCPCS

## 2021-01-02 PROCEDURE — 85025 COMPLETE CBC W/AUTO DIFF WBC: CPT

## 2021-01-02 RX ORDER — ACETAMINOPHEN 325 MG/1
650 TABLET ORAL EVERY 4 HOURS PRN
Status: DISCONTINUED | OUTPATIENT
Start: 2021-01-02 | End: 2021-01-03

## 2021-01-02 RX ORDER — BENZTROPINE MESYLATE 1 MG/ML
2 INJECTION INTRAMUSCULAR; INTRAVENOUS 2 TIMES DAILY PRN
Status: DISCONTINUED | OUTPATIENT
Start: 2021-01-02 | End: 2021-01-03

## 2021-01-02 RX ORDER — IBUPROFEN 400 MG/1
400 TABLET ORAL EVERY 6 HOURS PRN
Status: DISCONTINUED | OUTPATIENT
Start: 2021-01-02 | End: 2021-01-03

## 2021-01-02 RX ORDER — NICOTINE POLACRILEX 4 MG
15 LOZENGE BUCCAL PRN
Status: DISCONTINUED | OUTPATIENT
Start: 2021-01-02 | End: 2021-01-02

## 2021-01-02 RX ORDER — DEXTROSE MONOHYDRATE 25 G/50ML
12.5 INJECTION, SOLUTION INTRAVENOUS PRN
Status: DISCONTINUED | OUTPATIENT
Start: 2021-01-02 | End: 2021-01-02

## 2021-01-02 RX ORDER — OLANZAPINE 10 MG/1
10 TABLET ORAL
Status: ACTIVE | OUTPATIENT
Start: 2021-01-02 | End: 2021-01-02

## 2021-01-02 RX ORDER — TRAZODONE HYDROCHLORIDE 50 MG/1
50 TABLET ORAL ONCE
Status: COMPLETED | OUTPATIENT
Start: 2021-01-02 | End: 2021-01-02

## 2021-01-02 RX ORDER — TRAZODONE HYDROCHLORIDE 50 MG/1
50 TABLET ORAL NIGHTLY PRN
Status: DISCONTINUED | OUTPATIENT
Start: 2021-01-03 | End: 2021-01-07 | Stop reason: HOSPADM

## 2021-01-02 RX ORDER — MAGNESIUM HYDROXIDE/ALUMINUM HYDROXICE/SIMETHICONE 120; 1200; 1200 MG/30ML; MG/30ML; MG/30ML
30 SUSPENSION ORAL EVERY 6 HOURS PRN
Status: DISCONTINUED | OUTPATIENT
Start: 2021-01-02 | End: 2021-01-07 | Stop reason: HOSPADM

## 2021-01-02 RX ORDER — DEXTROSE MONOHYDRATE 50 MG/ML
100 INJECTION, SOLUTION INTRAVENOUS PRN
Status: DISCONTINUED | OUTPATIENT
Start: 2021-01-02 | End: 2021-01-02

## 2021-01-02 RX ORDER — OLANZAPINE 10 MG/1
10 INJECTION, POWDER, LYOPHILIZED, FOR SOLUTION INTRAMUSCULAR
Status: ACTIVE | OUTPATIENT
Start: 2021-01-02 | End: 2021-01-02

## 2021-01-02 RX ORDER — HYDROXYZINE PAMOATE 25 MG/1
25 CAPSULE ORAL ONCE
Status: DISCONTINUED | OUTPATIENT
Start: 2021-01-02 | End: 2021-01-02

## 2021-01-02 RX ORDER — 0.9 % SODIUM CHLORIDE 0.9 %
1000 INTRAVENOUS SOLUTION INTRAVENOUS ONCE
Status: COMPLETED | OUTPATIENT
Start: 2021-01-02 | End: 2021-01-02

## 2021-01-02 RX ORDER — 0.9 % SODIUM CHLORIDE 0.9 %
1000 INTRAVENOUS SOLUTION INTRAVENOUS ONCE
Status: COMPLETED | OUTPATIENT
Start: 2021-01-02 | End: 2021-01-03

## 2021-01-02 RX ADMIN — SODIUM CHLORIDE 1000 ML: 9 INJECTION, SOLUTION INTRAVENOUS at 15:50

## 2021-01-02 RX ADMIN — SODIUM CHLORIDE 1000 ML: 9 INJECTION, SOLUTION INTRAVENOUS at 18:49

## 2021-01-02 RX ADMIN — TRAZODONE HYDROCHLORIDE 50 MG: 50 TABLET ORAL at 23:19

## 2021-01-02 RX ADMIN — INSULIN LISPRO 4 UNITS: 100 INJECTION, SOLUTION INTRAVENOUS; SUBCUTANEOUS at 17:36

## 2021-01-02 ASSESSMENT — SLEEP AND FATIGUE QUESTIONNAIRES
DO YOU USE A SLEEP AID: NO
AVERAGE NUMBER OF SLEEP HOURS: 6

## 2021-01-02 NOTE — ED NOTES
IV started in left wrist first attempt. See flow sheet. IVF started per order see YVON Allan RN  01/02/21 5631

## 2021-01-02 NOTE — ED NOTES
Level of Care Disposition: Admit      Patient was seen by ED provider and [facility] staff. The case presented to psychiatric provider on-call . Based on the ED evaluation and information presented to the provider by this nurse it was determined that inpatient hospitalization is the least restrictive environment for the patient at this time. The patient will be admitted to the inpatient unit. Admitting provider did not order suicide precautions based on patient able to contract for safety while here. RATIONALE FOR ADMISSION:   Patient has a mental illness: Depression  Patient at imminent risk of danger to self as demonstrated by thoughts of suicide with plan. Patient unable to feel safe at home. Patient has shown an inability to care for self demonstrated by lack of sleep, poor appetite. Patient has no outpatient provider.    Patient is at imminent risk of violating their own rights or the rights AND they could benefit from psychiatric treatment                 Cristina Conte RN  01/02/21 1963
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
## 2021-01-02 NOTE — ED NOTES
Patient to be admitted to [facility] per Dr. Ted Wood on Low Dose Humalog Sliding Scale Coverage once patient is medically cleared.      Rey Allan RN  01/02/21 7381

## 2021-01-02 NOTE — ED PROVIDER NOTES
[address] COMPLAINT  Psychiatric Evaluation (depression and anxiety for years \" I can't deal with things anymore everything keeps building up, I lost my home, my brother said I can stay with him but he is hard to get along with, I just want to go somewhere and die\")       HISTORY OF PRESENT ILLNESS  Tommie Herrera is a 54 y.o. female with a past medical history of depression, diabetes, thyroid disease who presents to the ED complaining of depression and anxiety. Suicidal ideation: reports yes  Plan: drive car into woods and stay there until she   Previous attempts: no  Homicidal ideation: denies  Access to firearms: denies  Audiovisual hallucinations: denies  Psychiatric medications: denies  Tobacco use: yes  Alcohol use: occasionally  Illicit drug use: marijuana    Somatic complaints: does report chronic intermittent nausea, no vomiting    Has not taken insulin for a year. Reports 2y ago, she tended to run 200-300s. No other complaints, modifying factors or associated symptoms. I have reviewed the following from the nursing documentation.     Past Medical History:   Diagnosis Date    Depression     Diabetes mellitus ([facility] [address]     Hepatitis B surface antigen positive 2016    Neuropathy     Thyroid disease     Type II or unspecified type diabetes mellitus without mention of complication, not stated as uncontrolled      Past Surgical History:   Procedure Laterality Date    COLONOSCOPY  2017    polyp removal    FINGER TRIGGER RELEASE Right 2019    RIGHT THUMB TRIGGER DIGIT RELEASE performed by Chi Cordero MD at [address] [place] [address]  age 10   [de-identified] UPPER GASTROINTESTINAL ENDOSCOPY  2016    UPPER GASTROINTESTINAL ENDOSCOPY  2017     Family History   Problem Relation Age of Onset    Diabetes Mother     High Blood Pressure Mother     Cancer Father     Diabetes Father     Lupus Sister  Cancer Sister     Heart Disease Brother      Social History     Socioeconomic History    Marital status: Single     Spouse name: Not on file    Number of children: Not on file    Years of education: Not on file    Highest education level: Not on file   Occupational History    Not on file   Social Needs    Financial resource strain: Not on file    Food insecurity     Worry: Not on file     Inability: Not on file    Transportation needs     Medical: Not on file     Non-medical: Not on file   Tobacco Use    Smoking status: Current Every Day Smoker     Packs/day: 1.00     Types: Cigarettes    Smokeless tobacco: Never Used   Substance and Sexual Activity    Alcohol use: Yes     Comment: occ    Drug use: Yes     Frequency: 3.0 times per week     Types: Marijuana     Comment: last used 2 days ago    Sexual activity: Yes     Partners: Male   Lifestyle    Physical activity     Days per week: Not on file     Minutes per session: Not on file    Stress: Not on file   Relationships    Social connections     Talks on phone: Not on file     Gets together: Not on file     Attends Denominational service: Not on file     Active member of club or organization: Not on file     Attends meetings of clubs or organizations: Not on file     Relationship status: Not on file    Intimate partner violence     Fear of current or ex partner: Not on file     Emotionally abused: Not on file     Physically abused: Not on file     Forced sexual activity: Not on file   Other Topics Concern    Not on file   Social History Narrative    ** Merged History Encounter **          Current Facility-Administered Medications   Medication Dose Route Frequency Provider Last Rate Last Admin    acetaminophen (TYLENOL) tablet 650 mg  650 mg Oral Q4H PRN Maria Teresa Cardenas MD        ibuprofen (ADVIL;MOTRIN) tablet 400 mg  400 mg Oral Q6H PRN Maria Teresa Cardenas MD MUSCULOSKELETAL: No extremity edema. Compartments soft. No deformity. No tenderness in the extremities. All extremities neurovascularly intact. SKIN: Warm and dry. No acute rashes. NEUROLOGICAL: Alert and oriented. No gross facial drooping. Strength 5/5, sensation intact. PSYCHIATRIC: Tearful, reports suicidal ideation. Denies homicidal ideation. Does not appear to be responding to internal stimuli. LABS  I have reviewed all labs for this visit.    Results for orders placed or performed during the hospital encounter of 01/02/21   CBC auto differential   Result Value Ref Range    WBC 6.8 4.0 - 11.0 K/uL    RBC 4.82 4.00 - 5.20 M/uL    Hemoglobin 15.2 12.0 - 16.0 g/dL    Hematocrit 44.4 36.0 - 48.0 %    MCV 92.2 80.0 - 100.0 fL    MCH 31.6 26.0 - 34.0 pg    MCHC 34.3 31.0 - 36.0 g/dL    RDW 13.2 12.4 - 15.4 %    Platelets 550 302 - 880 K/uL    MPV 8.8 5.0 - 10.5 fL    Neutrophils % 76.6 %    Lymphocytes % 15.1 %    Monocytes % 6.4 %    Eosinophils % 0.6 %    Basophils % 1.3 %    Neutrophils Absolute 5.2 1.7 - 7.7 K/uL    Lymphocytes Absolute 1.0 1.0 - 5.1 K/uL    Monocytes Absolute 0.4 0.0 - 1.3 K/uL    Eosinophils Absolute 0.0 0.0 - 0.6 K/uL    Basophils Absolute 0.1 0.0 - 0.2 K/uL   Comprehensive Metabolic Panel w/ Reflex to MG   Result Value Ref Range    Sodium 129 (L) 136 - 145 mmol/L    Potassium reflex Magnesium 3.8 3.5 - 5.1 mmol/L    Chloride 95 (L) 99 - 110 mmol/L    CO2 22 21 - 32 mmol/L    Anion Gap 12 3 - 16    Glucose 418 (H) 70 - 99 mg/dL    BUN 11 7 - 20 mg/dL    CREATININE 0.6 0.6 - 1.1 mg/dL    GFR Non-African American >60 >60    GFR African American >60 >60    Calcium 9.4 8.3 - 10.6 mg/dL    Total Protein 8.6 (H) 6.4 - 8.2 g/dL    Alb 3.8 3.4 - 5.0 g/dL    Albumin/Globulin Ratio 0.8 (L) 1.1 - 2.2    Total Bilirubin 1.1 (H) 0.0 - 1.0 mg/dL    Alkaline Phosphatase 149 (H) 40 - 129 U/L    ALT 36 10 - 40 U/L    AST 46 (H) 15 - 37 U/L    Globulin 4.8 g/dL   Acetaminophen level Result Value Ref Range    Acetaminophen Level <5 (L) 10 - 30 ug/mL   Salicylate   Result Value Ref Range    Salicylate, Serum <6.7 (L) 15.0 - 30.0 mg/dL   Urine, reflex to culture    Specimen: Urine, clean catch   Result Value Ref Range    Color, UA Yellow Straw/Yellow    Clarity, UA Clear Clear    Glucose, Ur >=1000 (A) Negative mg/dL    Bilirubin Urine Negative Negative    Ketones, Urine 15 (A) Negative mg/dL    Specific Gravity, UA 1.025 1.005 - 1.030    Blood, Urine MODERATE (A) Negative    pH, UA 5.5 5.0 - 8.0    Protein,  (A) Negative mg/dL    Urobilinogen, Urine 1.0 <2.0 E.U./dL    Nitrite, Urine Negative Negative    Leukocyte Esterase, Urine Negative Negative    Microscopic Examination YES     Urine Type NotGiven     Urine Reflex to Culture Not Indicated    Drug screen multi urine   Result Value Ref Range    Amphetamine Screen, Urine Neg Negative <1000ng/mL    Barbiturate Screen, Ur Neg Negative <200 ng/mL    Benzodiazepine Screen, Urine Neg Negative <200 ng/mL    Cannabinoid Scrn, Ur POSITIVE (A) Negative <50 ng/mL    Cocaine Metabolite Screen, Urine Neg Negative <300 ng/mL    Opiate Scrn, Ur Neg Negative <300 ng/mL    PCP Screen, Urine Neg Negative <25 ng/mL    Methadone Screen, Urine Neg Negative <300 ng/mL    Propoxyphene Scrn, Ur Neg Negative <300 ng/mL    Oxycodone Urine Neg Negative <100 ng/ml    pH, UA 5.5     Drug Screen Comment: see below    Ethanol   Result Value Ref Range    Ethanol Lvl None Detected mg/dL   Blood Gas, Venous   Result Value Ref Range    pH, Stone 7.370 7.350 - 7.450    pCO2, Stone 40.3 40.0 - 50.0 mmHg    pO2, Stone 46.5 (H) 25.0 - 40.0 mmHg    HCO3, Venous 22.8 (L) 23.0 - 29.0 mmol/L    Base Excess, Stone -2.3 -3.0 - 3.0 mmol/L    O2 Sat, Stone 81 Not Established %    Carboxyhemoglobin 7.9 (H) 0.0 - 1.5 %    MetHgb, Stone 0.3 <1.5 %    TC02 (Calc), Stone 24 Not Established mmol/L    O2 Content, Stone 18 Not Established VOL %    O2 Therapy Unknown    HCG Qualitative, Serum Result Value Ref Range    hCG Qual Negative Detects HCG level >10 MIU/mL   COVID-19   Result Value Ref Range    SARS-CoV-2, NAAT Not Detected Not Detected   Microscopic Urinalysis   Result Value Ref Range    Hyaline Casts, UA 11-20 (A) 0 - 2 /LPF    Fine Casts, UA 3-5 (A) 0 - 2 /LPF    Mucus, UA 3+ (A) None Seen /LPF    WBC, UA 0-2 0 - 5 /HPF    RBC, UA 11-20 (A) 0 - 4 /HPF    Epithelial Cells, UA 2-5 0 - 5 /HPF    Bacteria, UA 1+ (A) None Seen /HPF   POCT glucose   Result Value Ref Range    Glucose 394 mg/dL   POCT Glucose   Result Value Ref Range    POC Glucose 394 (H) 70 - 99 mg/dl    Performed on ACCU-CHEK    POCT glucose   Result Value Ref Range    Glucose 307 mg/dL    QC OK?  yes    POCT Glucose   Result Value Ref Range    POC Glucose 307 (H) 70 - 99 mg/dl    Performed on ACCU-CHEK    POCT glucose   Result Value Ref Range    Glucose 341 mg/dL    QC OK? ok    POCT Glucose   Result Value Ref Range    POC Glucose 341 (H) 70 - 99 mg/dl    Performed on ACCU-CHEK    POCT Glucose   Result Value Ref Range    POC Glucose 126 (H) 70 - 99 mg/dl    Performed on ACCU-CHEK    EKG 12 Lead   Result Value Ref Range    Ventricular Rate 109 BPM    Atrial Rate 109 BPM    P-R Interval 164 ms    QRS Duration 100 ms    Q-T Interval 348 ms    QTc Calculation (Bazett) 468 ms    P Axis 75 degrees    R Axis 78 degrees    T Axis 45 degrees    Diagnosis       Sinus tachycardia with Premature atrial complexesRSR' or QR pattern in V1 suggests right ventricular conduction delayBorderline ECGWhen compared with ECG of 22-NOV-2016 20:15,Premature atrial complexes are now PresentRSR' pattern in V1 is now PresentConfirmed by Pop Muñoz ([id number]) on 1/2/2021 3:30:30 PM       During the patient's ED course, the patient was given:  Medications   acetaminophen (TYLENOL) tablet 650 mg (has no administration in time range)   ibuprofen (ADVIL;MOTRIN) tablet 400 mg (has no administration in time range) magnesium hydroxide (MILK OF MAGNESIA) 400 MG/5ML suspension 30 mL (has no administration in time range)   aluminum & magnesium hydroxide-simethicone (MAALOX) 200-200-20 MG/5ML suspension 30 mL (has no administration in time range)   OLANZapine (ZYPREXA) tablet 10 mg (has no administration in time range)     Or   OLANZapine (ZYPREXA) injection 10 mg (has no administration in time range)   sterile water injection 2.1 mL (has no administration in time range)   traZODone (DESYREL) tablet 50 mg (has no administration in time range)   benztropine mesylate (COGENTIN) injection 2 mg (has no administration in time range)   insulin lispro (HUMALOG) injection vial 0-6 Units (has no administration in time range)   insulin lispro (HUMALOG) injection vial 0-3 Units (has no administration in time range)   0.9 % sodium chloride bolus (1,000 mLs Intravenous New Bag 1/2/21 1550)   0.9 % sodium chloride bolus (1,000 mLs Intravenous New Bag 1/2/21 1849)        ED COURSE/MDM  Patient seen and evaluated. Old records reviewed. Labs and imaging reviewed and results discussed with patient. Overall, well appearing patient in no acute distress, presenting for suicidal ideation and depression. Physical exam unremarkable. Patient has no somatic complaints. We will obtain laboratory studies for medical clearance. ED Course as of Jan 02 2300   Sat Jan 02, 2021   1559 Urinalysis shows moderate blood, some ketones, and glucose. No evidence of infection.    [ER]   1559 Urine drug screen positive for cannabinoids. [ER]   3486 Patient's Covid swab is negative. [ER]   1559 No leukocytosis, anemia, thrombocytopenia. [ER]   1600 Blood gas shows no acidemia or hypercarbia. [ER]   1600 Patient has hyponatremia, hypochloremia, and elevated glucose. Suspect that hyponatremia is related to pseudohyponatremia from hyperglycemia. Corrected sodium is 134.     [ER] 1601 Patient has transaminitis, improved from previous evaluations 7 months ago. Patient does not have any right upper quadrant abdominal pain. Low suspicion for acute hepatitis or other acute liver pathology at this time. [ER]   417 11 148 Patient is hyperglycemic. She is a noncompliant type II diabetic. She does report some nausea but denies any abdominal pain. She does not have altered mental status. Her glucose is elevated but there is no anion gap and she is not acidemic. Low suspicion for DKA at this time. Will give  IV fluids, will not give insulin and patient has not taken it in greater than 1 year.    [ER]   1602 Patient is not pregnant.    [ER]   1602 Ethanol level negative. [ER]   6070 Tylenol and salicylate levels negative. [ER]   1752 Repeat glucose 307 after fluids. Patient given sliding scale insulin    [ER]   1752 Psychiatry plans to admit the patient when she is medically cleared. [ER]   2104 Patient did receive a second bolus of fluids with improvement of her heart rate and hyperglycemia. Patient is medically cleared for admission at this time. She is being admitted to psychiatry. [ER]      ED Course User Index  [ER] Ani John MD     Patient was noted to be hyperglycemic on arrival.  She did not have an anion gap or acidemia. He has not been on insulin for greater than 1 year. Patient was given fluids and subcutaneous insulin with improvement of hyperglycemia. Patient had some hyponatremia, suspect this is pseudohyponatremia in the setting of hyperglycemia. Urinalysis showed moderate blood, patient is not complaining of any abdominal pain or urinary infectious symptoms. Low suspicion for UTI or symptomatic kidney stone. Urine drug screen positive for cannabinoids. Tanner [place] studies otherwise unremarkable. Patient was initially significantly tachycardic suspect this was related to anxiety. This resolved with fluids. At this time, patient medically cleared for psychiatric admission. Hospitalist team will further manage her insulin regimen. CLINICAL IMPRESSION  1. Suicidal ideation    2. Type 2 diabetes mellitus with hyperglycemia, without long-term current use of insulin (HCC)    3. Hematuria, unspecified type        Blood pressure 115/72, pulse 87, temperature 97.1 °F (36.2 °C), temperature source Temporal, resp. rate 16, weight 135 lb (61.2 kg), last menstrual period 12/06/2016, SpO2 97 %, not currently breastfeeding. DISPOSITION  Rosita Muñoz was admitted in stable condition. DISCLAIMER: This chart was created using Dragon dictation software. Efforts were made by me to ensure accuracy, however some errors may be present due to limitations of this technology and occasionally words are not transcribed correctly.         Xavier Dalton MD  01/02/21 9945

## 2021-01-02 NOTE — ED NOTES
BS rechecked and HR = 107. Notified Dr. Bouchra Gutierrez. New order received for 2nd NS 1 L bolus.       Adolfo Montelongo RN  01/02/21 3187

## 2021-01-03 PROBLEM — F12.10 CANNABIS USE DISORDER, MILD, ABUSE: Status: ACTIVE | Noted: 2021-01-03

## 2021-01-03 PROBLEM — H35.81 MACULAR EDEMA: Status: ACTIVE | Noted: 2021-01-03

## 2021-01-03 PROBLEM — E11.9 DM (DIABETES MELLITUS) (HCC): Status: ACTIVE | Noted: 2021-01-03

## 2021-01-03 PROBLEM — E87.1 HYPONATREMIA: Status: ACTIVE | Noted: 2021-01-03

## 2021-01-03 PROBLEM — R79.89 ELEVATED TSH: Status: ACTIVE | Noted: 2021-01-03

## 2021-01-03 LAB
ESTIMATED AVERAGE GLUCOSE: 248.9 MG/DL
GLUCOSE BLD-MCNC: 191 MG/DL (ref 70–99)
GLUCOSE BLD-MCNC: 197 MG/DL (ref 70–99)
GLUCOSE BLD-MCNC: 300 MG/DL (ref 70–99)
GLUCOSE BLD-MCNC: 380 MG/DL (ref 70–99)
HBA1C MFR BLD: 10.3 %
PERFORMED ON: ABNORMAL

## 2021-01-03 PROCEDURE — 99223 1ST HOSP IP/OBS HIGH 75: CPT | Performed by: PSYCHIATRY & NEUROLOGY

## 2021-01-03 PROCEDURE — 1240000000 HC EMOTIONAL WELLNESS R&B

## 2021-01-03 PROCEDURE — 6370000000 HC RX 637 (ALT 250 FOR IP): Performed by: PSYCHIATRY & NEUROLOGY

## 2021-01-03 RX ORDER — HYDROXYZINE PAMOATE 50 MG/1
50 CAPSULE ORAL EVERY 6 HOURS PRN
Status: DISCONTINUED | OUTPATIENT
Start: 2021-01-03 | End: 2021-01-07 | Stop reason: HOSPADM

## 2021-01-03 RX ORDER — DEXTROSE MONOHYDRATE 50 MG/ML
100 INJECTION, SOLUTION INTRAVENOUS PRN
Status: DISCONTINUED | OUTPATIENT
Start: 2021-01-03 | End: 2021-01-07 | Stop reason: HOSPADM

## 2021-01-03 RX ORDER — DEXTROSE MONOHYDRATE 25 G/50ML
12.5 INJECTION, SOLUTION INTRAVENOUS PRN
Status: DISCONTINUED | OUTPATIENT
Start: 2021-01-03 | End: 2021-01-07 | Stop reason: HOSPADM

## 2021-01-03 RX ORDER — NICOTINE POLACRILEX 4 MG
15 LOZENGE BUCCAL PRN
Status: DISCONTINUED | OUTPATIENT
Start: 2021-01-03 | End: 2021-01-07 | Stop reason: HOSPADM

## 2021-01-03 RX ORDER — ACETAMINOPHEN 325 MG/1
650 TABLET ORAL EVERY 4 HOURS PRN
Status: DISCONTINUED | OUTPATIENT
Start: 2021-01-03 | End: 2021-01-07 | Stop reason: HOSPADM

## 2021-01-03 RX ADMIN — TRAZODONE HYDROCHLORIDE 50 MG: 50 TABLET ORAL at 21:07

## 2021-01-03 RX ADMIN — INSULIN LISPRO 5 UNITS: 100 INJECTION, SOLUTION INTRAVENOUS; SUBCUTANEOUS at 13:08

## 2021-01-03 RX ADMIN — INSULIN LISPRO 1 UNITS: 100 INJECTION, SOLUTION INTRAVENOUS; SUBCUTANEOUS at 09:52

## 2021-01-03 RX ADMIN — HYDROXYZINE PAMOATE 50 MG: 50 CAPSULE ORAL at 12:22

## 2021-01-03 ASSESSMENT — SLEEP AND FATIGUE QUESTIONNAIRES
DIFFICULTY STAYING ASLEEP: YES
DO YOU USE A SLEEP AID: NO
DIFFICULTY FALLING ASLEEP: YES
DO YOU HAVE DIFFICULTY SLEEPING: YES
SLEEP PATTERN: DIFFICULTY FALLING ASLEEP;DISTURBED/INTERRUPTED SLEEP

## 2021-01-03 ASSESSMENT — PATIENT HEALTH QUESTIONNAIRE - PHQ9: SUM OF ALL RESPONSES TO PHQ QUESTIONS 1-9: 22

## 2021-01-03 ASSESSMENT — LIFESTYLE VARIABLES
HISTORY_ALCOHOL_USE: NO
HISTORY_ALCOHOL_USE: YES

## 2021-01-03 NOTE — PROGRESS NOTES
Patient arrived to the [facility] from the [facility] with [facility] police in attendance and [facility] AN AFFILIATE OF [facility] staff.

## 2021-01-03 NOTE — PROGRESS NOTES
Patient is resting quietly in bed. Respirations are even and easy. Both eyes are closed. Medication is noted to be effective.

## 2021-01-03 NOTE — PLAN OF CARE
Problem: Suicide risk  Goal: Provide patient with safe environment  Description: Provide patient with safe environment  Outcome: Ongoing  Note: Patient has been visible on this unit. She is  pleasant and cooperative. She denies suicidal and homicidal ideation. She denies any hallucinations.

## 2021-01-03 NOTE — GROUP NOTE
Group Therapy Note    Date: 1/3/2021    Group Start Time: 0915  Group End Time: 3481  Group Topic: Cognitive Skills    [address] [facility], [place], [facility]      Group Therapy Note    Attendees: 10         Patient's Goal:  Learn to identify unhealthy thought patterns/distortions and strategies to re-frame thoughts. Notes:  Pt actively participated in group discussion and provided appropriate feedback. Pt verbalized that she tends to make should statements and that others attempt to tell her what she should/shouldn't do which is upsetting. Status After Intervention:  Improved    Participation Level:  Active Listener and Interactive    Participation Quality: Appropriate, Attentive and Sharing      Speech:  pressured      Thought Process/Content: Logical      Affective Functioning: Blunted      Mood: depressed      Level of consciousness:  Alert, Oriented x4 and Attentive      Response to Learning: Able to verbalize current knowledge/experience, Able to verbalize/acknowledge new learning, Able to retain information, Capable of insight, Able to change behavior and Progressing to goal      Endings: None Reported    Modes of Intervention: Education, Support, Socialization, Exploration, Clarifying, Problem-solving, Confrontation, Limit-setting and Reality-testing      Discipline Responsible: /Counselor      Signature:  [facility]

## 2021-01-03 NOTE — H&P
[address]                 [address] [address]                              HISTORY AND PHYSICAL    PATIENT NAME: Silvia Wasserman                    :        1965  MED REC NO:   [id number]                          ROOM:       9673  ACCOUNT NO:   [de-identified]                           ADMIT DATE: 2021  PROVIDER:     Thelma Kinney MD    IDENTIFICATION:  This is a homeless, , unemployed, 28-year-old  with a history of depression and anxiety, who was brought to [facility]  Emergency Department by her sister-in-law because of worsening symptoms  of depression and suicidality. SOURCES OF INFORMATION:  The patient. ED record. CHIEF COMPLAINT:  \"I've nothing to live for. I wanted to end my life. \"    HISTORY OF PRESENT ILLNESS:  The patient reports worsening symptoms of  anxiety and depression in the setting of numerous psychosocial  stressors. She describes worsening low mood, anhedonia, decreased  appetite with unintentional weight loss, poor concentration, fatigue,  tearfulness, insomnia, and suicidal thinking. Yesterday, after   an argument with her brother and sister-in-law, her suicidality  became acutely worse and she had fantasies about driving off a bridge. Because of this, she was brought in to the emergency department for  evaluation. PSYCHIATRIC REVIEW OF SYSTEMS:  No psychosis. No sabiha. STRESSORS:  Homeless, financial, limited social support. PSYCHIATRIC HISTORY:  No hospitalizations. No suicide attempts. Some  outpatient counseling in the past.  She has never seen a psychiatrist.   She has been prescribed antidepressant medicine by her PCP and feels  many of them have not been beneficial.    SUBSTANCE USE HISTORY:  Occasional alcohol. Cannabis use when she has  it. She recently experimented with kratom.   Never been through a  substance use treatment program. MEDICAL HISTORY:  Diabetes mellitus, neuropathy, macular edema. No  traumatic brain injuries, seizures or major surgeries. She may have  hypothyroidism. FAMILY HISTORY:  Depression. No suicide attempts. CURRENT MEDICATIONS:  None. She says she still takes something for her  diabetes, but has not been on it in some time. ALLERGIES:  PENICILLIN and LEVAQUIN. SOCIAL HISTORY:  Born in [place]. Two brothers and one sister. Parents are . She has six step-siblings. No high school. She  last worked 3 years ago. She is . She has two children. She  recently was staying in her car, then briefly with her brother, and is  now homeless. LEGAL HISTORY:  None. REVIEW OF SYSTEMS:  She did not describe or endorse recent headaches,  change in vision, chest pain, shortness of breath, cough, sore throat,  fevers, abdominal pain, neurological problems, bleeding problems or skin  problems. She was moving all four extremities and speaking without  difficulty. MENTAL STATUS EXAMINATION:  The patient is in hospital [facility]. She is  very thin. She spoke freely and had fair eye contact. She was socially  appropriate. She described her mood as \"very depressed\" and had a  tearful affect. She had no psychomotor agitation or retardation. She spoke very softly. She was not pressured. She was oriented to the  date, day, place, and the context of this evaluation. Her memory was  intact. Her use of language, speech, and educational attainment suggested an  average level of intellectual functioning. Her thought processes were logical and goal-directed. She did not  describe or endorse hallucinations, delusions or homicidal thinking. She did endorse suicidal thinking, though reported feeling safe here and  willing to approach staff with concerns. Her ability for abstract thought was intact based on her interpretation  of simple proverbs. Insight and judgment were intact.

## 2021-01-04 LAB
ANION GAP SERPL CALCULATED.3IONS-SCNC: 6 MMOL/L (ref 3–16)
BUN BLDV-MCNC: 14 MG/DL (ref 7–20)
CALCIUM SERPL-MCNC: 9.1 MG/DL (ref 8.3–10.6)
CHLORIDE BLD-SCNC: 99 MMOL/L (ref 99–110)
CHOLESTEROL, TOTAL: 183 MG/DL (ref 0–199)
CO2: 27 MMOL/L (ref 21–32)
CREAT SERPL-MCNC: <0.5 MG/DL (ref 0.6–1.1)
GFR AFRICAN AMERICAN: >60
GFR NON-AFRICAN AMERICAN: >60
GLUCOSE BLD-MCNC: 163 MG/DL (ref 70–99)
GLUCOSE BLD-MCNC: 179 MG/DL (ref 70–99)
GLUCOSE BLD-MCNC: 184 MG/DL (ref 70–99)
GLUCOSE BLD-MCNC: 310 MG/DL (ref 70–99)
GLUCOSE BLD-MCNC: 369 MG/DL (ref 70–99)
HDLC SERPL-MCNC: 49 MG/DL (ref 40–60)
LDL CHOLESTEROL CALCULATED: 110 MG/DL
PERFORMED ON: ABNORMAL
POTASSIUM SERPL-SCNC: 4 MMOL/L (ref 3.5–5.1)
SODIUM BLD-SCNC: 132 MMOL/L (ref 136–145)
T4 FREE: 1.1 NG/DL (ref 0.9–1.8)
TRIGL SERPL-MCNC: 119 MG/DL (ref 0–150)
VLDLC SERPL CALC-MCNC: 24 MG/DL

## 2021-01-04 PROCEDURE — 80048 BASIC METABOLIC PNL TOTAL CA: CPT

## 2021-01-04 PROCEDURE — 6370000000 HC RX 637 (ALT 250 FOR IP): Performed by: NURSE PRACTITIONER

## 2021-01-04 PROCEDURE — 1240000000 HC EMOTIONAL WELLNESS R&B

## 2021-01-04 PROCEDURE — 99233 SBSQ HOSP IP/OBS HIGH 50: CPT | Performed by: PSYCHIATRY & NEUROLOGY

## 2021-01-04 PROCEDURE — 80061 LIPID PANEL: CPT

## 2021-01-04 PROCEDURE — 83036 HEMOGLOBIN GLYCOSYLATED A1C: CPT

## 2021-01-04 PROCEDURE — 6370000000 HC RX 637 (ALT 250 FOR IP): Performed by: PSYCHIATRY & NEUROLOGY

## 2021-01-04 PROCEDURE — 36415 COLL VENOUS BLD VENIPUNCTURE: CPT

## 2021-01-04 RX ORDER — INSULIN GLARGINE 100 [IU]/ML
10 INJECTION, SOLUTION SUBCUTANEOUS NIGHTLY
Status: DISCONTINUED | OUTPATIENT
Start: 2021-01-04 | End: 2021-01-07 | Stop reason: HOSPADM

## 2021-01-04 RX ORDER — NORTRIPTYLINE HYDROCHLORIDE 25 MG/1
25 CAPSULE ORAL ONCE
Status: COMPLETED | OUTPATIENT
Start: 2021-01-04 | End: 2021-01-04

## 2021-01-04 RX ORDER — NORTRIPTYLINE HYDROCHLORIDE 25 MG/1
25 CAPSULE ORAL NIGHTLY
Status: DISCONTINUED | OUTPATIENT
Start: 2021-01-04 | End: 2021-01-05

## 2021-01-04 RX ADMIN — INSULIN GLARGINE 10 UNITS: 100 INJECTION, SOLUTION SUBCUTANEOUS at 22:17

## 2021-01-04 RX ADMIN — TRAZODONE HYDROCHLORIDE 50 MG: 50 TABLET ORAL at 21:34

## 2021-01-04 RX ADMIN — NORTRIPTYLINE HYDROCHLORIDE 25 MG: 25 CAPSULE ORAL at 14:11

## 2021-01-04 RX ADMIN — NORTRIPTYLINE HYDROCHLORIDE 25 MG: 25 CAPSULE ORAL at 21:34

## 2021-01-04 NOTE — FLOWSHEET NOTE
01/04/21 1448   Encounter Summary   Services provided to: Patient   Continue Visiting   (1/4 Pt attended [employer]ism service.)   Complexity of Encounter High   Length of Encounter 1 hour   Spiritual/Baptist   Type Spiritual support   Assessment Calm; Approachable   Intervention Active listening;Explored feelings, thoughts, concerns;Explored coping resources;Nurtured hope;Prayer;Scripture;Ritual;Discussed meaning/purpose;Discussed relationship with God;Discussed belief system/Spiritism practices/juve   Outcome Connection/belonging;Comfort;Expressed gratitude;Engaged in conversation;Encouraged; Hopeful;Receptive
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\"   Motivation for SA Treatment   Stage of engagement Pre-engagement/engagement   Motivation for treatment No   Education   Education   (Pt reported completing 9th grade)   Work History   Currently employed No    service   (Pt denied)   /VA involvement Pt denied   Leisure/Activity   Past interests Pt reported \"everything\"   Present interests Pt reported likes to do crafts, watch tv/youtub videos - short episode; read   Current daily activity Pt reported cleaning brother's house, start a project/stop then do something else   Social with friends/family Yes   Cultural and Spiritual   Spiritual concerns No   Cultural concerns No     Therapist met with pt to complete psychosocial/leisure assessments and CSSRS. Pt reported frequent SI with no plan or intent; pt reported praying to g-d to take her. Pt vocalized feeling like a burden on her family and at times feeling worthless. Pt endorsed s/s of depression, scoring 20 on the phq9. Pt reported sleep distances and rumination/racing thoughts. Pt reported going from home to home of relatives and friends however something always happens so she leaves and has been living in her car. Pt reported prior attempts at taking antidepressants did not work and she would experiences significant side effects. Pt denied prior psychiatric admissions or current outpatient treatment; pt endorsed desire to be setup with outpatient provider.       AGUSTIN Díaz, [facility]

## 2021-01-04 NOTE — PLAN OF CARE
Problem: Suicide risk  Goal: Provide patient with safe environment  Description: Provide patient with safe environment  1/3/2021 2141 by Ana Pedraza RN  Outcome: Ongoing  1/3/2021 1853 by Sarah Smith RN  Outcome: Ongoing  Note: Patient has been visible on this unit. She is  pleasant and cooperative. She denies suicidal and homicidal ideation. She denies any hallucinations. Problem: Tobacco Use:  Goal: Inpatient tobacco use cessation counseling participation  Description: Inpatient tobacco use cessation counseling participation  Outcome: Ongoing     Problem: Pain:  Goal: Pain level will decrease  Description: Pain level will decrease  Outcome: Ongoing  Goal: Control of acute pain  Description: Control of acute pain  Outcome: Ongoing  Goal: Control of chronic pain  Description: Control of chronic pain  Outcome: Ongoing     Problem: Serum Glucose Level - Abnormal:  Goal: Ability to maintain appropriate glucose levels has stabilized  Description: Ability to maintain appropriate glucose levels has stabilized  Outcome: Ongoing   Patient denied SI/HI, A/V hallucinations. She is out in dayroom and social with peers. She denied any nicotine breakthrough tonight. Patient is cooperative with finger stick blood sugars and insulin coverage as indicated. Safety checks continue Q 15 minutes through out the shift.

## 2021-01-04 NOTE — GROUP NOTE
Group Therapy Note    Date: 1/4/2021    Group Start Time: 1110  Group End Time: 3123  Group Topic: Psychotherapy    [facility]    Justina Palacios, [facility]        Group Therapy Note    Attendees: 7         Patient's Goal: Pt's goal was to accept other for who they are. Notes:  Pt was engaged in group. Pt participated in group discussion. Pt shared about herself and offered support and feedback to other group members. Pt met goal.    Status After Intervention:  Improved    Participation Level:  Active Listener and Interactive    Participation Quality: Appropriate, Attentive, Sharing and Supportive      Speech:  normal      Thought Process/Content: Logical  Linear      Affective Functioning: Congruent      Mood: anxious and depressed      Level of consciousness:  Alert, Oriented x4 and Attentive      Response to Learning: Able to verbalize current knowledge/experience, Able to verbalize/acknowledge new learning, Able to retain information, Capable of insight, Able to change behavior and Progressing to goal      Endings: None Reported    Modes of Intervention: Education, Support, Socialization, Exploration, Clarifying, Problem-solving, Activity, Movement, Confrontation, Limit-setting and Reality-testing      Discipline Responsible: /Counselor      Signature:  Justina Palacios, [facility]

## 2021-01-04 NOTE — PLAN OF CARE
Problem: Suicide risk  Goal: Provide patient with safe environment  Description: Provide patient with safe environment  Outcome: Met This Shift     Problem: Tobacco Use:  Goal: Inpatient tobacco use cessation counseling participation  Description: Inpatient tobacco use cessation counseling participation  Outcome: Ongoing     Problem: Serum Glucose Level - Abnormal:  Goal: Ability to maintain appropriate glucose levels has stabilized  Description: Ability to maintain appropriate glucose levels has stabilized  Outcome: Ongoing  Pt has remained free from falls and injury so far this shift. Med compliant. Denies SI/HI, AVH. 10 units Lantus nightly will start tonight due to glucose levels not stabilized. Pt states she slept well last night. Visible on unit, playing games with peers. Makes needs known by coming to team station. Nonskid footwear on. Will continue to monitor.

## 2021-01-04 NOTE — GROUP NOTE
Group Therapy Note    Date: 1/4/2021    Group Start Time: 1000  Group End Time: 1050  Group Topic: Psychoeducation    [address]        Group Therapy Note    Attendees: 7         Patient's Goal:  To learn and discuss communication styles of being assertive, passive and aggressive and that being assertive is the healthiest way to communicate. Notes: pt actively participated in group discussion and was able to apply to her life. Status After Intervention:  Improved    Participation Level:  Active Listener and Interactive    Participation Quality: Appropriate, Attentive, Sharing and Supportive      Speech:  normal      Thought Process/Content: Logical      Affective Functioning: Congruent      Mood: anxious and depressed      Level of consciousness:  Alert, Oriented x4 and Attentive      Response to Learning: Able to verbalize current knowledge/experience, Able to verbalize/acknowledge new learning and Progressing to goal      Endings: None Reported    Modes of Intervention: Education, Support, Socialization, Exploration and Clarifying      Discipline Responsible: /Counselor      Signature:  SAL Younger

## 2021-01-04 NOTE — H&P
Hospital Medicine History & Physical      PCP: Jose Alberto Richard MD    Date of Admission: 1/2/2021    Date of Service: Pt seen/examined on 1/4/2021     Chief Complaint:    Chief Complaint   Patient presents with    Psychiatric Evaluation     depression and anxiety for years \" I can't deal with things anymore everything keeps building up, I lost my home, my brother said I can stay with him but he is hard to get along with, I just want to go somewhere and die\"         History Of Present Illness: The patient is a 54 y.o. female with DM type 2, thyroid disease, depression and neuropathy who presented to [facility] for depression and SI. Patient was seen and evaluated in the ED by the ED medical provider, patient was medically cleared for admission to [facility] at [facility]. This note serves as an admission medical H&P. Tobacco use: yes  ETOH use: occ   Illicit drug use: yes marijuana UDS +    Patient denies any medical complaints     Past Medical History:        Diagnosis Date    Depression     Diabetes mellitus ([facility] [address]     Hepatitis B surface antigen positive 11/22/2016    Neuropathy     Thyroid disease     Type II or unspecified type diabetes mellitus without mention of complication, not stated as uncontrolled        Past Surgical History:        Procedure Laterality Date    COLONOSCOPY  01/23/2017    polyp removal    FINGER TRIGGER RELEASE Right 1/17/2019    RIGHT THUMB TRIGGER DIGIT RELEASE performed by Andi Driver MD at [address] [place] [address] 10   [facility] ENDOSCOPY  12/08/2016    UPPER GASTROINTESTINAL ENDOSCOPY  01/23/2017       Medications Prior to Admission:    Prior to Admission medications    Medication Sig Start Date End Date Taking?  Authorizing Provider   insulin glargine (BASAGLAR KWIKPEN) 100 UNIT/ML injection pen Inject 50 Units into the skin daily Pt states she takes in AM     Historical Provider, MD Psych: Per psychiatry team evaluation     CBC:   Recent Labs     01/02/21  1517   WBC 6.8   HGB 15.2   HCT 44.4   MCV 92.2        BMP:   Recent Labs     01/02/21  1517 01/04/21  0530   * 132*   K 3.8 4.0   CL 95* 99   CO2 22 27   BUN 11 14   CREATININE 0.6 <0.5*     LIVER PROFILE:   Recent Labs     01/02/21  1517   AST 46*   ALT 36   BILITOT 1.1*   ALKPHOS 149*     PT/INR: No results for input(s): PROTIME, INR in the last 72 hours. APTT: No results for input(s): APTT in the last 72 hours. UA:  Recent Labs     01/02/21  1506   COLORU Yellow   PHUR 5.5  5.5   WBCUA 0-2   RBCUA 11-20*   MUCUS 3+*   BACTERIA 1+*   CLARITYU Clear   SPECGRAV 1.025   LEUKOCYTESUR Negative   UROBILINOGEN 1.0   BILIRUBINUR Negative   BLOODU MODERATE*   GLUCOSEU >=1000*        U/A:    Lab Results   Component Value Date    COLORU Yellow 01/02/2021    WBCUA 0-2 01/02/2021    RBCUA 11-20 01/02/2021    MUCUS 3+ 01/02/2021    BACTERIA 1+ 01/02/2021    CLARITYU Clear 01/02/2021    SPECGRAV 1.025 01/02/2021    LEUKOCYTESUR Negative 01/02/2021    BLOODU MODERATE 01/02/2021    GLUCOSEU >=1000 01/02/2021       CULTURES  SARS-CoV-2, NAAT Not Detected     EKG:  I have reviewed the EKG with the following interpretation:   ST with incomplete RBBB QTc 468    ASSESSMENT/PLAN:  MDD  - per psychiatry team    Diabetes Mellitus type 2  - not controlled  - HbA1c was 10.3 on 1/3/2021  - urine showed >=1000 glucose  - pt stated she stopped taking her insulin joey 1 year mac  - did not tolerate metformin and would like to be placed back on insulin.   Was on Levemir at one point and switched to Lantus due to insurance reasons  - Will start Lantus tonight  - continue SS    Hyponatremia  - 129 on admit--> repeat today 132  - likely related to increase blood glucose    Hematuria   - moderate blood in urine  - pt reported pink urine yesterday and has resolved today  - will order renal ultrasound  - will need follow up outpatient with urology Hypothyroidism  - was on synthroid but was told to stop taking.  - TSH 6.39- T4 1.1  - follow up with PCP in 4-6 weeks to repeat    Neuropathy    - monitor     Tobacco Dependence  -Recommended cessation  - nicotine patch ordered     Cannabis Abuse  - UDS +  - counseled cessation     Pt has no medical complaints at this time. They were informed that should a medical concern arise during their admission they may have [facility] contact us.         ARACLEI Fox - CNP   1/4/2021

## 2021-01-05 ENCOUNTER — APPOINTMENT (OUTPATIENT)
Dept: ULTRASOUND IMAGING | Age: 56
DRG: 751 | End: 2021-01-05
Payer: MEDICAID

## 2021-01-05 LAB
ESTIMATED AVERAGE GLUCOSE: 248.9 MG/DL
GLUCOSE BLD-MCNC: 197 MG/DL (ref 70–99)
GLUCOSE BLD-MCNC: 231 MG/DL (ref 70–99)
GLUCOSE BLD-MCNC: 248 MG/DL (ref 70–99)
GLUCOSE BLD-MCNC: 274 MG/DL (ref 70–99)
HBA1C MFR BLD: 10.3 %
PERFORMED ON: ABNORMAL

## 2021-01-05 PROCEDURE — 6370000000 HC RX 637 (ALT 250 FOR IP): Performed by: PSYCHIATRY & NEUROLOGY

## 2021-01-05 PROCEDURE — 99233 SBSQ HOSP IP/OBS HIGH 50: CPT | Performed by: PSYCHIATRY & NEUROLOGY

## 2021-01-05 PROCEDURE — 1240000000 HC EMOTIONAL WELLNESS R&B

## 2021-01-05 PROCEDURE — 6370000000 HC RX 637 (ALT 250 FOR IP): Performed by: NURSE PRACTITIONER

## 2021-01-05 PROCEDURE — 76770 US EXAM ABDO BACK WALL COMP: CPT

## 2021-01-05 RX ORDER — NORTRIPTYLINE HYDROCHLORIDE 25 MG/1
50 CAPSULE ORAL NIGHTLY
Status: DISCONTINUED | OUTPATIENT
Start: 2021-01-05 | End: 2021-01-07 | Stop reason: HOSPADM

## 2021-01-05 RX ADMIN — NORTRIPTYLINE HYDROCHLORIDE 50 MG: 25 CAPSULE ORAL at 20:40

## 2021-01-05 RX ADMIN — TRAZODONE HYDROCHLORIDE 50 MG: 50 TABLET ORAL at 20:40

## 2021-01-05 RX ADMIN — INSULIN GLARGINE 10 UNITS: 100 INJECTION, SOLUTION SUBCUTANEOUS at 21:39

## 2021-01-05 ASSESSMENT — PAIN SCALES - GENERAL: PAINLEVEL_OUTOF10: 0

## 2021-01-05 ASSESSMENT — PAIN DESCRIPTION - PAIN TYPE: TYPE: CHRONIC PAIN

## 2021-01-05 ASSESSMENT — PAIN DESCRIPTION - ORIENTATION: ORIENTATION: LOWER

## 2021-01-05 NOTE — GROUP NOTE
Group Therapy Note    Date: 1/5/2021    Group Start Time: 1300  Group End Time: 8528  Group Topic: Psychoeducation    [address]        Group Therapy Note    Attendees: 7    Patient's Goal: to engage in discussing bodily experiences felt during anxiety, discuss and identify anxiety relief techniques to utilize, and share/discuss barriers to utilizing techniques to increase ability to identify anxiety earlier in the process, increase utilization of anxiety relief techniques, and improve overall understanding of anxiety as a process. Notes:  Rosita actively engaged in group throughout. Pt engaged in discussions and provided support and feedback to peers. Rosita verbalized learning. Status After Intervention:  Improved    Participation Level:  Active Listener and Interactive    Participation Quality: Appropriate, Attentive, Sharing and Supportive      Speech:  normal      Thought Process/Content: Linear      Affective Functioning: Constricted/Restricted      Mood: anxious and depressed      Level of consciousness:  Alert, Oriented x4 and Attentive      Response to Learning: Able to verbalize current knowledge/experience, Able to verbalize/acknowledge new learning, Able to retain information and Progressing to goal      Endings: None Reported    Modes of Intervention: Education, Support, Socialization, Problem-solving and Activity      Discipline Responsible: Psychoeducational Specialist      Signature:  ABIGAIL Finnegan

## 2021-01-05 NOTE — PROGRESS NOTES
Department of Psychiatry  Progress Note    Patient's chart was reviewed. Discussed with treatment team. Met with patient. SUBJECTIVE:      Some improvement today but remains despondent. SI resolving. Agreed to increase nortriptyline to 50mg QHS. Discussed disposition at length - she has no home, no money, and very limited support. ROS:   Patient has new complaints: no  Sleeping adequately:  Improved  Appetite adequate: No   Engaged in programming: some    OBJECTIVE:  VITALS:  BP 98/68   Pulse 94   Temp 96.9 °F (36.1 °C) (Temporal)   Resp 16   Ht 5' 10\" (1.778 m)   Wt 135 lb (61.2 kg)   LMP 12/06/2016 (Exact Date)   SpO2 97%   Breastfeeding No   BMI 19.37 kg/m²     Mental Status Examination:    Appearance: fair grooming and hygiene, thin   Behavior/Attitude toward examiner:   fair eye contact  Speech: low volume, non-pressured  Mood:  \"ok\"  Affect:  blunted but less tearful    Thought processes:  Goal directed, linear, no SREEDHAR or gross disorganization  Thought Content: less SI, no HI, no delusions voiced, no obsessions  Perceptions: no AVH  Attention: attention span and concentration were intact to interview   Abstraction: intact  Cognition:  Alert and oriented to person, place, time, and situation, recall intact  Insight: improving  Judgment: improving    Medication:  none    PRN:  acetaminophen, hydrOXYzine, glucose, dextrose, glucagon (rDNA), dextrose, magnesium hydroxide, aluminum & magnesium hydroxide-simethicone, traZODone     FORMULATION:  This is a newly homeless, , unemployed 49-year-old  with a history of depression and anxiety, who was brought to [facility]  Emergency Department by her sister-in-law with worsening symptoms of  depression and suicidality. The patient meets criteria for major  depressive disorder. Given her suicidality and inability to care for  herself, she requires inpatient stabilization and treatment.     Principal Problem: Major depressive disorder without psychotic features  Active Problems:    Cannabis use disorder, mild, abuse    Macular edema    DM (diabetes mellitus) (HCC)    Elevated TSH    Hyponatremia  Resolved Problems:    * No resolved hospital problems. *     PLAN:  1. Admit to Inpatient Psychiatry for stabilization and treatment. 2.  On admission, discussed treatment options for depression at length. The patient  has been on multiple different antidepressants. Discussed trialing  another category of medicines such as a TCA or MAOI. She agreed to  think about it before starting. Ordered every 15-minute checks for  safety, programming, and p.r.n. medication for anxiety, agitation, and  Insomnia. 1/4/2020 - started nortriptyline 25mg daily. 1/5/2020 - increase nortriptyline to 50mg daily. 3.  Internal medicine consult for admission. Diabetes Mellitus type 2  - not controlled  - HbA1c was 10.3 on 1/3/2021  - urine showed >=1000 glucose  - pt stated she stopped taking her insulin joey 1 year mac  - did not tolerate metformin and would like to be placed back on insulin. Was on Levemir at one point and switched to Lantus due to insurance reasons  - lantis  - continue SS     Hyponatremia  - 129 on admit--> repeat today 132  - likely related to increase blood glucose     Hematuria   - moderate blood in urine  - pt reported pink urine yesterday and has resolved today  - will order renal ultrasound  - will need follow up outpatient with urology      Hypothyroidism  - was on synthroid but was told to stop taking.  - TSH 6.39- T4 1.1  - follow up with PCP in 4-6 weeks to repeat     Neuropathy    - monitor     4. Estimated length of stay 4-6 days. The patient is voluntary. Total time with patient was 35 minutes and more than 50 % of that time was spent counseling the patient on their symptoms, treatment, and expected goals.      Abel Hernnadez MD, [address], [place]

## 2021-01-05 NOTE — PLAN OF CARE
Problem: Suicide risk  Goal: Provide patient with safe environment  Description: Provide patient with safe environment  Outcome: Ongoing     Problem: Serum Glucose Level - Abnormal:  Goal: Ability to maintain appropriate glucose levels has stabilized  Description: Ability to maintain appropriate glucose levels has stabilized  Outcome: Ongoing

## 2021-01-05 NOTE — PROGRESS NOTES
Patient in room, resting quietly in bed. Respirations are even and easy. eyesclosed. Medication is noted to be effective.

## 2021-01-05 NOTE — PROGRESS NOTES
Department of Psychiatry  Progress Note    Patient's chart was reviewed. Discussed with treatment team. Met with patient. SUBJECTIVE:      continues with symptoms of depression including hopelessness. Discussed treatment options at length. Agreed on starting Nortriptyline given previous antidepressant failures. ROS:   Patient has new complaints: no  Sleeping adequately:  no  Appetite adequate: No   Engaged in programming: some    OBJECTIVE:  VITALS:  BP 98/68   Pulse 94   Temp 96.9 °F (36.1 °C) (Temporal)   Resp 16   Ht 5' 10\" (1.778 m)   Wt 135 lb (61.2 kg)   LMP 12/06/2016 (Exact Date)   SpO2 97%   Breastfeeding No   BMI 19.37 kg/m²     Mental Status Examination:    Appearance: fair grooming and hygiene, thin   Behavior/Attitude toward examiner:   fair eye contact  Speech: low volume, non-pressured  Mood:  \"down\"  Affect:  blunted but less tearful    Thought processes:  Goal directed, linear, no SREEDHAR or gross disorganization  Thought Content: less SI, no HI, no delusions voiced, no obsessions  Perceptions: no AVH  Attention: attention span and concentration were intact to interview   Abstraction: intact  Cognition:  Alert and oriented to person, place, time, and situation, recall intact  Insight: Limited insight   Judgment: Limited judgment     Medication:  none    PRN:  acetaminophen, hydrOXYzine, glucose, dextrose, glucagon (rDNA), dextrose, magnesium hydroxide, aluminum & magnesium hydroxide-simethicone, traZODone     FORMULATION:  This is a newly homeless, , unemployed 66-year-old  with a history of depression and anxiety, who was brought to [facility]  Emergency Department by her sister-in-law with worsening symptoms of  depression and suicidality. The patient meets criteria for major  depressive disorder. Given her suicidality and inability to care for  herself, she requires inpatient stabilization and treatment.     Principal Problem: Major depressive disorder without psychotic features  Active Problems:    Cannabis use disorder, mild, abuse    Macular edema    DM (diabetes mellitus) (HCC)    Elevated TSH    Hyponatremia  Resolved Problems:    * No resolved hospital problems. *     PLAN:  1. Admit to Inpatient Psychiatry for stabilization and treatment. 2.  On admission, discussed treatment options for depression at length. The patient  has been on multiple different antidepressants. Discussed trialing  another category of medicines such as a TCA or MAOI. She agreed to  think about it before starting. Ordered every 15-minute checks for  safety, programming, and p.r.n. medication for anxiety, agitation, and  Insomnia. 1/4/2020 - start nortriptyline 25mg daily. 3.  Internal medicine consult for admission. Diabetes Mellitus type 2  - not controlled  - HbA1c was 10.3 on 1/3/2021  - urine showed >=1000 glucose  - pt stated she stopped taking her insulin joey 1 year mac  - did not tolerate metformin and would like to be placed back on insulin. Was on Levemir at one point and switched to Lantus due to insurance reasons  - lantis  - continue SS     Hyponatremia  - 129 on admit--> repeat today 132  - likely related to increase blood glucose     Hematuria   - moderate blood in urine  - pt reported pink urine yesterday and has resolved today  - will order renal ultrasound  - will need follow up outpatient with urology      Hypothyroidism  - was on synthroid but was told to stop taking.  - TSH 6.39- T4 1.1  - follow up with PCP in 4-6 weeks to repeat     Neuropathy    - monitor     4. Estimated length of stay 4-6 days. The patient is voluntary. Total time with patient was 35 minutes and more than 50 % of that time was spent counseling the patient on their symptoms, treatment, and expected goals.        Alejandrina Brian MD, [facility] [address] [place],[address], [place]

## 2021-01-05 NOTE — PLAN OF CARE
Problem: Suicide risk  Goal: Provide patient with safe environment  Description: Provide patient with safe environment  1/5/2021 0000 by Orlin Barney RN  Outcome: Ongoing  Problem: Tobacco Use:  Goal: Inpatient tobacco use cessation counseling participation  Description: Inpatient tobacco use cessation counseling participation  1/5/2021 0000 by Orlin Barney RN  Outcome: Ongoing    Problem: Pain:  Goal: Pain level will decrease  Description: Pain level will decrease  Outcome: Ongoing  Goal: Control of acute pain  Description: Control of acute pain  Outcome: Ongoing  Goal: Control of chronic pain  Description: Control of chronic pain  Outcome: Ongoing   Patient denied SI/HI,A/V hallucinations. She is able to come to staff if thoughts of self harm were to occur. She denied any breakthrough cravings for nicotine. She did complaint of lower back pain and requested an ice pack for her back. Rosita is cooperative with fingerstick blood sugars. 2100 blood sugar obtained and patient received sliding scale coverage for the same. Safety checks continue Q 15 minutes through out the shift.

## 2021-01-06 LAB
GLUCOSE BLD-MCNC: 152 MG/DL (ref 70–99)
GLUCOSE BLD-MCNC: 199 MG/DL (ref 70–99)
GLUCOSE BLD-MCNC: 242 MG/DL (ref 70–99)
GLUCOSE BLD-MCNC: 292 MG/DL (ref 70–99)
PERFORMED ON: ABNORMAL

## 2021-01-06 PROCEDURE — 6370000000 HC RX 637 (ALT 250 FOR IP): Performed by: NURSE PRACTITIONER

## 2021-01-06 PROCEDURE — 97165 OT EVAL LOW COMPLEX 30 MIN: CPT

## 2021-01-06 PROCEDURE — 6370000000 HC RX 637 (ALT 250 FOR IP): Performed by: PSYCHIATRY & NEUROLOGY

## 2021-01-06 PROCEDURE — 97535 SELF CARE MNGMENT TRAINING: CPT

## 2021-01-06 PROCEDURE — 99233 SBSQ HOSP IP/OBS HIGH 50: CPT | Performed by: PSYCHIATRY & NEUROLOGY

## 2021-01-06 PROCEDURE — 1240000000 HC EMOTIONAL WELLNESS R&B

## 2021-01-06 RX ADMIN — TRAZODONE HYDROCHLORIDE 50 MG: 50 TABLET ORAL at 21:43

## 2021-01-06 RX ADMIN — INSULIN GLARGINE 10 UNITS: 100 INJECTION, SOLUTION SUBCUTANEOUS at 21:36

## 2021-01-06 RX ADMIN — NORTRIPTYLINE HYDROCHLORIDE 50 MG: 25 CAPSULE ORAL at 21:37

## 2021-01-06 NOTE — GROUP NOTE
Group Therapy Note    Date: 1/6/2021    Group Start Time: 1300  Group End Time: 1400  Group Topic: Recreational    [address]        Group Therapy Note    Attendees: 9         Patient's Goal: to participate in a positive recreational activity and learn how it helps to improve socialization and mood. Notes: pt actively participated in recreational activity with other group members. Socialization and mood was improved. Pt met goal.                                                           Status After Intervention:  Improved    Participation Level:  Active Listener and Interactive    Participation Quality: Appropriate, Attentive, Sharing and Supportive      Speech:  normal      Thought Process/Content: Logical      Affective Functioning: Congruent      Mood: anxious      Level of consciousness:  Alert, Oriented x4 and Attentive      Response to Learning: Able to verbalize current knowledge/experience, Able to verbalize/acknowledge new learning and Progressing to goal      Endings: None Reported    Modes of Intervention: Education, Support, Socialization, Exploration, Clarifying and Activity      Discipline Responsible: /Counselor      Signature:  SAL Whatley

## 2021-01-06 NOTE — PLAN OF CARE
Problem: Suicide risk  Goal: Provide patient with safe environment  Description: Provide patient with safe environment  1/5/2021 2145 by Ericka Hughes RN  Outcome: Ongoing  Problem: Tobacco Use:  Goal: Inpatient tobacco use cessation counseling participation  Description: Inpatient tobacco use cessation counseling participation  Outcome: Ongoing   Problem: Pain:  Goal: Pain level will decrease  Description: Pain level will decrease  Outcome: Ongoing  Goal: Control of acute pain  Description: Control of acute pain  Outcome: Ongoing  Goal: Control of chronic pain  Description: Control of chronic pain  Outcome: Ongoing   Problem: Serum Glucose Level - Abnormal:  Goal: Ability to maintain appropriate glucose levels has stabilized  Description: Ability to maintain appropriate glucose levels has stabilized  1/5/2021 2145 by Ericka Hughes RN  Outcome: Ongoing  Patient denied SI/HI,A/V hallucinations this evening. She is asked to come to the staff if thoughts of self harm occur. She agrees to do this. She denies any c/o of nicotine withdraw and patch has been effective to decrease her cravings. She denied any complaint of pain this evening and understands to come to the team station if pain occurs. Patient is cooperative with blood sugar and ssc. Safety checks continue Q 15 minutes through out the shift.

## 2021-01-06 NOTE — BH NOTE
Group Therapy Note    Date: 1/5/2021  Start Time: 20:00  End Time:  21:00  Number of Participants: 9    Type of Group: Recreational   Wrap up    Carloz Canchola Information  Module Name:  /  Session Number:  /    Patient's Goal:  Better coping skills    Notes:  Continuing to work on goal    Status After Intervention:  Unchanged    Participation Level:  Active Listener and Interactive    Participation Quality: Appropriate, Attentive and Sharing      Speech:  normal      Thought Process/Content: Logical      Affective Functioning: Blunted      Mood: anxious      Level of consciousness:  Alert and Attentive      Response to Learning: Able to change behavior      Endings: None Reported    Modes of Intervention: Socialization and Problem-solving      Discipline Responsible: Behavorial Health Tech      Signature:  Estiven Short

## 2021-01-06 NOTE — PROGRESS NOTES
Patient remains up in dayroom watching TV. She said that she will go to bed soon. Will continue to monitor sleep pattern.

## 2021-01-06 NOTE — CARE COORDINATION
[facility]  Treatment Team Note  Day 3    Review Date & Time: 1/6/2021  0900    Patient was not in treatment team      Status EXAM:   Status and Exam  Normal: No  Facial Expression: Worried  Affect: Appropriate  Level of Consciousness: Alert  Mood:Normal: No  Mood: Anxious  Motor Activity:Normal: Yes  Motor Activity: Increased  Interview Behavior: Cooperative  Preception: Vida to Person, Roberta Scrape to Time, Vida to Place  Attention:Normal: Yes  Attention: Others(See comment)  Thought Processes: Circumstantial  Thought Content:Normal: Yes  Thought Content: Other(See Comment)  Hallucinations: None(patient denies)  Delusions: No  Memory:Normal: Yes  Insight and Judgment: No  Insight and Judgment: Poor Judgment, Poor Insight  Present Suicidal Ideation: No  Present Homicidal Ideation: No      Suicide Risk CSSR-S:  1) Within the past month, have you wished you were dead or wished you could go to sleep and not wake up? : Yes  2) Have you actually had any thoughts of killing yourself? : Yes  3) Have you been thinking about how you might kill yourself? : Yes  5) Have you started to work out or worked out the details of how to kill yourself?  Do you intend to carry out this plan? : Yes  6) Have you ever done anything, started to do anything, or prepared to do anything to end your life?: Yes      PLAN/TREATMENT RECOMMENDATIONS UPDATE: Patient will take medication as prescribed, eat 75% of meals, attend groups, participate in milieu activities, participate in treatment team and care planning for discharge and follow up    Nolberto Bernstein RN
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
## 2021-01-06 NOTE — PLAN OF CARE
Problem: Suicide risk  Goal: Provide patient with safe environment  Description: Provide patient with safe environment  Outcome: Ongoing     Problem: Pain:  Goal: Pain level will decrease  Description: Pain level will decrease  Outcome: Ongoing     Problem: Serum Glucose Level - Abnormal:  Goal: Ability to maintain appropriate glucose levels has stabilized  Description: Ability to maintain appropriate glucose levels has stabilized  Outcome: Ongoing   Patient was visible and social on unit. Medication complaint. Attended and participated in groups. Patient was calm and cooperative with interview. Patient denied SI/HI/AVH. Patient states overall her mood has improved since admission.

## 2021-01-07 VITALS
DIASTOLIC BLOOD PRESSURE: 68 MMHG | WEIGHT: 135 LBS | RESPIRATION RATE: 16 BRPM | HEART RATE: 81 BPM | TEMPERATURE: 97.5 F | BODY MASS INDEX: 19.33 KG/M2 | SYSTOLIC BLOOD PRESSURE: 140 MMHG | OXYGEN SATURATION: 98 % | HEIGHT: 70 IN

## 2021-01-07 LAB
GLUCOSE BLD-MCNC: 132 MG/DL (ref 70–99)
GLUCOSE BLD-MCNC: 245 MG/DL (ref 70–99)
PERFORMED ON: ABNORMAL
PERFORMED ON: ABNORMAL

## 2021-01-07 PROCEDURE — 99239 HOSP IP/OBS DSCHRG MGMT >30: CPT | Performed by: PSYCHIATRY & NEUROLOGY

## 2021-01-07 PROCEDURE — 5130000000 HC BRIDGE APPOINTMENT

## 2021-01-07 RX ORDER — INSULIN GLARGINE 100 [IU]/ML
10 INJECTION, SOLUTION SUBCUTANEOUS NIGHTLY
Qty: 1 VIAL | Refills: 3 | Status: SHIPPED | OUTPATIENT
Start: 2021-01-07

## 2021-01-07 RX ORDER — NORTRIPTYLINE HYDROCHLORIDE 50 MG/1
50 CAPSULE ORAL NIGHTLY
Qty: 30 CAPSULE | Refills: 0 | Status: SHIPPED | OUTPATIENT
Start: 2021-01-07 | End: 2021-02-06

## 2021-01-07 NOTE — PLAN OF CARE
Problem: Suicide risk  Goal: Provide patient with safe environment  Description: Provide patient with safe environment  1/6/2021 2051 by Sierra Quinn RN  Outcome: Ongoing     Problem: Pain:  Goal: Pain level will decrease  Description: Pain level will decrease  1/6/2021 2051 by Sierra Quinn RN  Outcome: Ongoing    Pt has been pleasant and cooperative with staff. She has been mostly isolative to self but has been visible in the day room and watching tv. She denies SI/HI/AVH and no RTIS noted. She denies having a suicidal plan but says, \"I don't have a plan, but I don't care what happens to me. \" Pt is currently sitting in the day room attending evening wrap up group. Denies needs at this time.  Electronically signed by Sierra Quinn RN on 1/6/2021 at 8:55 PM

## 2021-01-07 NOTE — SUICIDE SAFETY PLAN
SAFETY PLAN    A suicide Safety Plan is a document that supports someone when they are having thoughts of suicide. Warning Signs that indicate a suicidal crisis may be developing: What (situations, thoughts, feelings, body sensations, behaviors, etc.) do you experience that lets you know you are beginning to think about suicide? 1. When I find myself holding my breathe    Internal Coping Strategies:  What things can I do (relaxation techniques, hobbies, physical activities, etc.) to take my mind off my problems without contacting another person? 1. breathe  2. Repeat bible versus    People and social settings that provide distraction: Who can I call or where can I go to distract me? The park and backyard    People whom I can ask for help: Who can I call when I need help - for example, friends, family, clergy, someone else? 1. Name: Juan Carlos Crooks              2. Name: Farhat Jack or Behavioral Health agencies I can contact during a crisis: Who can I call for help - for example, my doctor, my psychiatrist, my psychologist, a mental health provider, a suicide hotline? 1. Clinician Name: [address] [place] [address]   Address: [address] [place], [facility]      Phone  #: [phone]    3. Suicide Prevention Lifeline: 2-010-569-TALK (7601)    Making the environment safe: How can I make my environment (house/apartment/living space) safer? For example, can I remove guns, medications, and other items? 1.  Not being alone walk away from negative people    Something that is important to me and worth living for is: my babies, daughters, and my grandchildren

## 2021-01-07 NOTE — PROGRESS NOTES
Department of Psychiatry  Progress Note    Patient's chart was reviewed. Discussed with treatment team. Met with patient. SUBJECTIVE:      Less depressed; less SI. Some dizziness and sedation today and so agreed to hold on increasing nortyptyline further. Discussed disposition at length - targeting DC tomorrow or Friday if continues to make gains in mood. ROS:   Patient has new complaints: no  Sleeping adequately:  Improved  Appetite adequate: improved  Engaged in programming: some    OBJECTIVE:  VITALS:  BP (!) 140/68   Pulse 81   Temp 97.5 °F (36.4 °C) (Temporal)   Resp 16   Ht 5' 10\" (1.778 m)   Wt 135 lb (61.2 kg)   LMP 12/06/2016 (Exact Date)   SpO2 98%   Breastfeeding No   BMI 19.37 kg/m²     Mental Status Examination:    Appearance: fair grooming and hygiene, thin   Behavior/Attitude toward examiner:   fair eye contact  Speech: low volume, non-pressured  Mood:  \"ok\"  Affect:  blunted   Thought processes:  Goal directed, linear, no SREEDHAR or gross disorganization  Thought Content: no SI, no HI, no delusions voiced, no obsessions  Perceptions: no AVH  Attention: attention span and concentration were intact to interview   Abstraction: intact  Cognition:  Alert and oriented to person, place, time, and situation, recall intact  Insight: improving  Judgment: improving    Medication:  none    PRN:  acetaminophen, hydrOXYzine, glucose, dextrose, glucagon (rDNA), dextrose, magnesium hydroxide, aluminum & magnesium hydroxide-simethicone, traZODone     FORMULATION:  This is a newly homeless, , unemployed 78-year-old  with a history of depression and anxiety, who was brought to [facility]  Emergency Department by her sister-in-law with worsening symptoms of  depression and suicidality. The patient meets criteria for major  depressive disorder and has dependent personality traits. Given her suicidality and inability to care for herself, she requires inpatient stabilization and treatment. Principal Problem:    Major depressive disorder without psychotic features  Active Problems:    Cannabis use disorder, mild, abuse    Macular edema    DM (diabetes mellitus) (HCC)    Elevated TSH    Hyponatremia  Resolved Problems:    * No resolved hospital problems. *     PLAN:  1. Admit to Inpatient Psychiatry for stabilization and treatment. 2.  On admission, discussed treatment options for depression at length. The patient  has been on multiple different antidepressants. Discussed trialing  another category of medicines such as a TCA or MAOI. She agreed to  think about it before starting. Ordered every 15-minute checks for  safety, programming, and p.r.n. medication for anxiety, agitation, and  Insomnia. 1/4/2020 - started nortriptyline 25mg daily. 1/5/2020 - increased nortriptyline to 50mg daily. 3.  Internal medicine consult for admission. Diabetes Mellitus type 2  - not controlled  - HbA1c was 10.3 on 1/3/2021  - urine showed >=1000 glucose  - pt stated she stopped taking her insulin joey 1 year mac  - did not tolerate metformin and would like to be placed back on insulin. Was on Levemir at one point and switched to Lantus due to insurance reasons  - lantis  - continue SS     Hyponatremia  - 129 on admit--> repeat today 132  - likely related to increase blood glucose     Hematuria   - moderate blood in urine  - pt reported pink urine yesterday and has resolved today  - will order renal ultrasound  - will need follow up outpatient with urology      Hypothyroidism  - was on synthroid but was told to stop taking.  - TSH 6.39- T4 1.1  - follow up with PCP in 4-6 weeks to repeat     Neuropathy    - monitor     4. Estimated length of stay 4-6 days. The patient is voluntary. Target DC this week. Total time with patient was 35 minutes and more than 50 % of that time was spent counseling the patient on their symptoms, treatment, and expected goals.      Tristin Mills MD, [address] [place],[address], [place]

## 2021-01-07 NOTE — GROUP NOTE
Group Therapy Note    Date: 1/7/2021    Group Start Time: 1000  Group End Time: 1100  Group Topic: Art Therapy     [facility]        Group Therapy Note    Attendees: 12          Patient's Goal:  Patients were invited to participate in an Art Therapy / Psycho-Education group on Recipes for Recovery. Patients were invited to process and create coping skill recipes identifying challenging situation(s) and a matching coping strategy, the ingredients/materials needed, and how to overcome possible challenges/barriers to use the coping skills. Utilizing art materials of their choosing and handouts provided on coping strategies, patients were encouraged to create their own coping skills resource and at the end of group, to share and process their work with peers. Notes:  Rosita was pulled by a staff member from group after the first five minutes and returned for the last 10 minutes of session. Rosita appeared attentive, but did not engage in group conversation. Status After Intervention:  Unchanged    Participation Level:  Active Listener    Participation Quality: Appropriate and Attentive      Speech:  normal      Thought Process/Content: Logical      Affective Functioning: Blunted      Mood: anxious      Level of consciousness:  Alert, Oriented x4 and Attentive      Response to Learning: Able to verbalize current knowledge/experience, Able to verbalize/acknowledge new learning, Able to retain information and Capable of insight      Endings: None Reported    Modes of Intervention: Education, Support, Socialization, Exploration, Clarifying, Problem-solving, Activity and Media      Discipline Responsible: Psychoeducational Specialist      Signature:  Edd Fry

## 2021-01-07 NOTE — PLAN OF CARE
Problem: Suicide risk  Goal: Provide patient with safe environment  Description: Provide patient with safe environment  Outcome: Ongoing     Problem: Pain:  Goal: Control of acute pain  Description: Control of acute pain  Outcome: Ongoing     Problem: Serum Glucose Level - Abnormal:  Goal: Ability to maintain appropriate glucose levels has stabilized  Description: Ability to maintain appropriate glucose levels has stabilized  Outcome: Ongoing   Patient visible on unit. Social with select peers. Medication compliant. Attended and participated in groups. Patient was able to express her needs to staff. Calm and cooperative with interview. Patient denies SI/HI/AVH. Patient feels that her mood has improved since admission. Patient having some increased anxiety due to trying to find a place to go once she is discharged. Blood sugars under adequate control with medications.

## 2021-01-07 NOTE — BH NOTE
Group Therapy Note    Date: 1/6/2021  Start Time: 20:00  End Time:  21:00  Number of Participants: 13    Type of Group: Recreational  Wrap up    Carloz Canchola Information  Module Name:  /  Session Number:  /    Patient's Goal:  Not to sleep all day    Notes:  Goal completed per pt    Status After Intervention:  Unchanged    Participation Level:  Active Listener and Interactive    Participation Quality: Appropriate, Attentive and Sharing      Speech:  normal      Thought Process/Content: Linear      Affective Functioning: Blunted      Mood: depressed      Level of consciousness:  Alert and Attentive      Response to Learning: Able to change behavior      Endings: None Reported    Modes of Intervention: Socialization and Problem-solving      Discipline Responsible: Behavorial Health Tech      Signature:  Haile James

## 2021-01-07 NOTE — BH NOTE
[facility]  Discharge Note    Pt discharged with followings belongings:   Dentures: Lowers, Uppers  Vision - Corrective Lenses: Contacts  Hearing Aid: None  Jewelry: None(small bag of jewerly)  Body Piercings Removed: N/A  Clothing: Footwear, Jacket / coat, Pants, Shirt  Were All Patient Medications Collected?: Yes  Other Valuables: Cell phone, Simeon Garcia, [address] sent home with patient. Valuables retrieved from safe and returned to patient. Patient education on aftercare instructions: yes. Information faxed to N/A by this writer. Patient verbalize understanding of AVS:  yes.     Status EXAM upon discharge:  Status and Exam  Normal: No  Facial Expression: Worried  Affect: Appropriate  Level of Consciousness: Alert  Mood:Normal: No  Mood: Anxious  Motor Activity:Normal: Yes  Motor Activity: Increased  Interview Behavior: Cooperative  Preception: Crosby to Person, Ashleigh Ashton to Time, Crosby to Place, Crosby to Situation  Attention:Normal: Yes  Attention: Others(See comment)  Thought Processes: Circumstantial  Thought Content:Normal: Yes  Thought Content: Other(See Comment)  Hallucinations: None  Delusions: No  Memory:Normal: Yes  Insight and Judgment: No  Insight and Judgment: Poor Judgment, Poor Insight  Present Suicidal Ideation: No  Present Homicidal Ideation: No      Metabolic Screening:    Lab Results   Component Value Date    LABA1C 10.3 01/04/2021       Lab Results   Component Value Date    CHOL 183 01/04/2021    CHOL 204 (H) 03/26/2019     Lab Results   Component Value Date    TRIG 119 01/04/2021    TRIG 118 03/26/2019     Lab Results   Component Value Date    HDL 49 01/04/2021    HDL 62 (H) 03/26/2019    HDL 51 12/07/2017     No components found for: [facility]  Lab Results   Component Value Date    LABVLDL 24 01/04/2021    LABVLDL 24 03/26/2019    LABVLDL 19 12/07/2017       Mick Deleon RN

## 2021-01-07 NOTE — GROUP NOTE
Group Therapy Note    Date: 1/7/2021    Group Start Time: 1110  Group End Time: 1150  Group Topic: Healthy Living/Wellness    [address] [place] [address]        Group Therapy Note    Attendees: 9    Patient's Goal: to participate in a [employer] Class to improve mobility, self-esteem, mood, and overall health, wellness, and quality of life. To discuss and identify the benefits of exercise and healthy ways to incorporate exercise into daily routine. Notes: Rosita actively participated in [employer] Class. Pt completed all movement directives. Pt identified multiple benefits of exercise and ways to incorporate exercise into his daily routine. Pt met group goal.     Status After Intervention:  Improved    Participation Level:  Active Listener and Interactive    Participation Quality: Appropriate and Attentive      Speech:  normal      Thought Process/Content: Logical      Affective Functioning: Congruent      Mood: euthymic      Level of consciousness:  Alert, Oriented x4 and Attentive      Response to Learning: Able to verbalize current knowledge/experience, Able to verbalize/acknowledge new learning and Progressing to goal      Endings: None Reported    Modes of Intervention: Education, Socialization, Exploration, Activity and Movement      Discipline Responsible: Psychoeducational Specialist      Signature:  Abimael Shook [place]

## 2021-02-10 NOTE — DISCHARGE SUMMARY
Department of Psychiatry    Discharge Summary      Lila Barker  [id number]    Admission date:   1/2/2021    Discharge:   Date: 1/7/2021  Location: home    Inpatient Provider: Tristin Mills MD, VALENTINE RAYA  Unit: [facility]    Diagnosis on Admission:  Major depressive disorder without psychotic features     Diagnosis on Discharge:  Major depressive disorder without psychotic features     Active Hospital Problems    Diagnosis Date Noted    Cannabis use disorder, mild, abuse [F12.10] 01/03/2021    Macular edema [H35.81] 01/03/2021    DM (diabetes mellitus) ([facility] [address] [E11.9] 01/03/2021    Elevated TSH [R79.89] 01/03/2021    Hyponatremia [E87.1] 01/03/2021    Major depressive disorder without psychotic features [F32.9] 01/02/2021       Reason for Admission:  From my admission note:  IDENTIFICATION:  This is a homeless, , unemployed, 43-year-old  with a history of depression and anxiety, who was brought to [facility] by her sister-in-law because of worsening symptoms  of depression and suicidality.     SOURCES OF INFORMATION:  The patient. ED record.     CHIEF COMPLAINT:  \"I've nothing to live for. I wanted to end my life. \"     HISTORY OF PRESENT ILLNESS:  The patient reports worsening symptoms of  anxiety and depression in the setting of numerous psychosocial  stressors. She describes worsening low mood, anhedonia, decreased  appetite with unintentional weight loss, poor concentration, fatigue,  tearfulness, insomnia, and suicidal thinking. Yesterday, after   an argument with her brother and sister-in-law, her suicidality  became acutely worse and she had fantasies about driving off a bridge. Because of this, she was brought in to the emergency department for  evaluation.     PSYCHIATRIC REVIEW OF SYSTEMS:  No psychosis. No sabiha.     STRESSORS:  Homeless, financial, limited social support.     PSYCHIATRIC HISTORY:  No hospitalizations. No suicide attempts.   Some outpatient counseling in the past.  She has never seen a psychiatrist.   She has been prescribed antidepressant medicine by her PCP and feels  many of them have not been beneficial.     SUBSTANCE USE HISTORY:  Occasional alcohol. Cannabis use when she has  it. She recently experimented with kratom. Never been through a  substance use treatment program.     MEDICAL HISTORY:  Diabetes mellitus, neuropathy, macular edema. No  traumatic brain injuries, seizures or major surgeries. She may have  hypothyroidism.     FAMILY HISTORY:  Depression. No suicide attempts.     MEDICATION on admission:  None. She says she still takes something for her  diabetes, but has not been on it in some time.     ALLERGIES:  PENICILLIN and LEVAQUIN.     SOCIAL HISTORY:  Born in [place]. Two brothers and one sister. Parents are . She has six step-siblings. No high school. She  last worked 3 years ago. She is . She has two children. She  recently was staying in her car, then briefly with her brother, and is  now homeless.     LEGAL HISTORY:  None.     REVIEW OF SYSTEMS:  She did not describe or endorse recent headaches,  change in vision, chest pain, shortness of breath, cough, sore throat,  fevers, abdominal pain, neurological problems, bleeding problems or skin  problems. She was moving all four extremities and speaking without  difficulty.     MENTAL STATUS EXAMINATION on admission:  The patient is in hospital gown. She is  very thin. She spoke freely and had fair eye contact. She was socially  appropriate. She described her mood as \"very depressed\" and had a  tearful affect. She had no psychomotor agitation or retardation.     She spoke very softly. She was not pressured. She was oriented to the  date, day, place, and the context of this evaluation.   Her memory was  intact.     Her use of language, speech, and educational attainment suggested an  average level of intellectual functioning.    1/5/2020 - increased nortriptyline to 50mg daily. Ms. Ashlee Holly responded to treatment including medication, programming, and the structured milieu. Her mood stabilized, her SI resolved, and she became more future oriented. She was active and participatory in the milieu and programming. She demonstrated safe behavior throughout her stay. She tolerated nortriptyline well and without side effects. She was committed to continuing treatment on an outpatient basis.      3.  Internal medicine consult for admission. Diabetes Mellitus type 2  - not controlled  - HbA1c was 10.3 on 1/3/2021  - urine showed >=1000 glucose  - pt stated she stopped taking her insulin joey 1 year mac  - did not tolerate metformin and would like to be placed back on insulin.  Was on Levemir at one point and switched to Lantus due to insurance reasons  - lantis  - continue SS     Hyponatremia  - 129 on admit--> repeat today 132  - likely related to increase blood glucose     Hematuria   - moderate blood in urine  - pt reported pink urine yesterday and has resolved today  - renal ultrasound - Unremarkable ultrasound of the kidneys and urinary bladder.  - will need follow up outpatient with urology      Hypothyroidism  - was on synthroid but was told to stop taking.  - TSH 6.39- T4 1.1  - follow up with PCP in 4-6 weeks to repeat     Neuropathy    - monitor      4.  Voluntary       Complications: none; Rosita Muñoz did not require emergency psychiatric intervention during this admission such as restraint or emergency medication.       Vital signs in last 24 hours:  Vitals:    01/07/21 0813   BP: (!) 140/68   Pulse: 81   Resp: 16   Temp: 97.5 °F (36.4 °C)   SpO2: 98%       Mental Status Examination on Discharge:    Appearance: fair grooming and hygiene, thin   Behavior/Attitude toward examiner:   fair eye contact  Speech: low volume, non-pressured  Mood:  \"ok\"  Affect:  blunted  Please follow up with your PCP regarding any pending labs. ** PLEASE FOLLOW UP WITH UROLOGY **    Here is the name and number of your primary care physician. You did not want an appointment made for you at this time. Terry Foote MD  Address: [address] [facility], [address] [facility]   Phone: [phone]    YOU REPORT YOU WILL BE GOING TO YOUR DAUGHTER MIRNA'S HOME:  [address] [facility] [place], [address]  [phone]    You have indicated to us that you would be agreeable to seeking mental health services. [facility] is a provider of mental health and case management services in [facility]. Open enrollment is available. Please call them at [phone] to set up an appointment for open enrollment, which is Monday - Friday from 9:30 AM to 11:00 AM.  Please bring a photo ID, insurance card, proof of address, current medicines, and copay. Sliding fee scale is available for [facility] residents with proof of residency. Name of Provider: [facility]   Provider specialty/license: SAL/DORETHA/MD  Date and time of appointment:  **PROPOSED CALL IN TIME TO ARRANGE FOR AN ASSESSMENT IS 1-8-21 AT 10:00AM**  The type/s of services requested are: case management, therapy, medication management  Agency name: [facility]  Address: [facility], [address] [facility]  Phone Number: [phone]   Special instructions (what to bring to appointment, etc.): Please bring a photo ID, insurance card, proof of address, current medicines, and copay. **YOU HAVE BEEN ADVISED THAT YOUR PRESCRIPTIONS WILL RUN OUT IN 30 DAYS. PLEASE FOLLOW UP WITH [facility] AS THEY CAN HELP YOU WITH MEDICATION MANAGEMENT. We also want to express the availability of Bridge Appointments here at [facility]. If you are connecting to community services and you find you have a long wait time until you can see your new psychiatrist, therapist, or primary care physician, know that we can provide health care services to you in the time between your care here and the beginning of your care with your new providers. As long as you have psychiatry and therapy appointments scheduled, we can see you for medicine management. If you need a Bridge Appointment, please call the  at [phone]. **With the current concerns for Coronavirus (COVID-19), please contact your providers prior to going in to their offices. [facility] and agencies have adjusted their practices to reduce spread of the illness. If you have any questions about the virus or recommendations for home care, please call the [facility]) COVID-19 hotline at [phone]. For all emergencies, please contact 911. **    More than 30 minutes were spent on day-of-discharge assessment and planning with the patient.

## 2023-02-09 ENCOUNTER — APPOINTMENT (OUTPATIENT)
Dept: GENERAL RADIOLOGY | Age: 58
End: 2023-02-09
Payer: MEDICAID

## 2023-02-09 ENCOUNTER — APPOINTMENT (OUTPATIENT)
Dept: CT IMAGING | Age: 58
End: 2023-02-09
Payer: MEDICAID

## 2023-02-09 ENCOUNTER — HOSPITAL ENCOUNTER (EMERGENCY)
Age: 58
Discharge: HOME | End: 2023-02-09
Payer: MEDICAID

## 2023-02-09 VITALS
RESPIRATION RATE: 16 BRPM | HEART RATE: 90 BPM | BODY MASS INDEX: 22.9 KG/M2 | HEIGHT: 70 IN | WEIGHT: 160 LBS | OXYGEN SATURATION: 98 % | TEMPERATURE: 98.1 F | SYSTOLIC BLOOD PRESSURE: 128 MMHG | DIASTOLIC BLOOD PRESSURE: 71 MMHG

## 2023-02-09 DIAGNOSIS — S42.211A FX HUMERAL NECK, RIGHT, CLOSED, INITIAL ENCOUNTER: Primary | ICD-10-CM

## 2023-02-09 DIAGNOSIS — S42.124A CLOSED NONDISPLACED FRACTURE OF ACROMIAL PROCESS OF RIGHT SCAPULA, INITIAL ENCOUNTER: ICD-10-CM

## 2023-02-09 PROCEDURE — 73200 CT UPPER EXTREMITY W/O DYE: CPT

## 2023-02-09 PROCEDURE — 96372 THER/PROPH/DIAG INJ SC/IM: CPT

## 2023-02-09 PROCEDURE — 99284 EMERGENCY DEPT VISIT MOD MDM: CPT

## 2023-02-09 PROCEDURE — 73060 X-RAY EXAM OF HUMERUS: CPT

## 2023-02-09 PROCEDURE — 6360000002 HC RX W HCPCS: Performed by: REGISTERED NURSE

## 2023-02-09 PROCEDURE — 73030 X-RAY EXAM OF SHOULDER: CPT

## 2023-02-09 PROCEDURE — 73080 X-RAY EXAM OF ELBOW: CPT

## 2023-02-09 PROCEDURE — 6370000000 HC RX 637 (ALT 250 FOR IP): Performed by: REGISTERED NURSE

## 2023-02-09 PROCEDURE — 73130 X-RAY EXAM OF HAND: CPT

## 2023-02-09 PROCEDURE — 73110 X-RAY EXAM OF WRIST: CPT

## 2023-02-09 PROCEDURE — 73090 X-RAY EXAM OF FOREARM: CPT

## 2023-02-09 RX ORDER — LIDOCAINE 4 G/G
1 PATCH TOPICAL DAILY
Qty: 30 PATCH | Refills: 0 | Status: SHIPPED | OUTPATIENT
Start: 2023-02-09 | End: 2023-03-11

## 2023-02-09 RX ORDER — HYDROCODONE BITARTRATE AND ACETAMINOPHEN 5; 325 MG/1; MG/1
1 TABLET ORAL EVERY 6 HOURS PRN
Qty: 12 TABLET | Refills: 0 | Status: SHIPPED | OUTPATIENT
Start: 2023-02-09 | End: 2023-02-12

## 2023-02-09 RX ORDER — LIDOCAINE 4 G/G
1 PATCH TOPICAL ONCE
Status: DISCONTINUED | OUTPATIENT
Start: 2023-02-09 | End: 2023-02-09 | Stop reason: HOSPADM

## 2023-02-09 RX ORDER — MORPHINE SULFATE 4 MG/ML
4 INJECTION, SOLUTION INTRAMUSCULAR; INTRAVENOUS ONCE
Status: COMPLETED | OUTPATIENT
Start: 2023-02-09 | End: 2023-02-09

## 2023-02-09 RX ADMIN — MORPHINE SULFATE 4 MG: 4 INJECTION, SOLUTION INTRAMUSCULAR; INTRAVENOUS at 18:13

## 2023-02-09 ASSESSMENT — ENCOUNTER SYMPTOMS
RHINORRHEA: 0
COUGH: 0
ABDOMINAL PAIN: 0
NAUSEA: 0
SORE THROAT: 0
BACK PAIN: 0
VOMITING: 0
DIARRHEA: 0
CONSTIPATION: 0
SHORTNESS OF BREATH: 0

## 2023-02-09 ASSESSMENT — PAIN DESCRIPTION - LOCATION: LOCATION: SHOULDER

## 2023-02-09 ASSESSMENT — PAIN SCALES - GENERAL
PAINLEVEL_OUTOF10: 7
PAINLEVEL_OUTOF10: 10

## 2023-02-09 ASSESSMENT — PAIN - FUNCTIONAL ASSESSMENT: PAIN_FUNCTIONAL_ASSESSMENT: 0-10

## 2023-02-09 NOTE — ED PROVIDER NOTES
[address] ED  EMERGENCY DEPARTMENT ENCOUNTER        Pt Name: Giorgio Urias  MRN: [id number]  [place] 1965  Date of evaluation: 2/9/2023  Provider: ARACELI Shaw CNP  PCP: Aissatou Riley MD    This patient was not seen and evaluated by the attending physician     I have evaluated this patient. My supervising physician was available for consultation. CHIEF COMPLAINT       Chief Complaint   Patient presents with    Shoulder Injury     Right shoulder injury. Pt lost balance coming down her outside steps and fell in the yard       HISTORY OF PRESENT ILLNESS   (Location/Symptom, Timing/Onset, Context/Setting, Quality, Duration, Modifying Factors, Severity)  Note limiting factors. Giorgio Urias is a 62 y.o. female who presents via private car for  mechanical fall, arm pain. Onset was this afternoon. Duration has been since the onset. Context includes patient presents to the emergency department today after sustaining a mechanical fall. She states she was walking outside to  sticks in her yard that the wind had blown down and she tripped on the third step pitching herself forward into the yard stating she fell on her right arm. She does endorse being right-hand dominant. She denies hitting her head and does not take blood thinners. She denies any dizziness or lightheadedness prior to or after the fall. She is localizing pain to the right upper arm, right shoulder and right elbow. Quality is throbbing and aching with radiation to right arm. Alleviating factors include nothing. Aggravating factors include admit. Pain is 10/10. Present has been used for pain today. Chart review reveals pt has significant PMHx of hepatitis B, depression, diabetes, neuropathy, thyroid disorder. They take insulin, nortriptyline. Nursing Notes were all reviewed and agreed with or any disagreements were addressed  in the HPI. Pt was seen during the [place] 19 pandemic. Appropriate PPE worn by ME during patient encounters. Pt seen during a time with constrained hospital bed capacity and other potential inpatient and outpatient resources were constrained due to the viral pandemic. REVIEW OF SYSTEMS    (2-9 systems for level 4, 10 or more for level 5)     Review of Systems   Constitutional:  Negative for chills, diaphoresis and fever. HENT:  Negative for congestion, rhinorrhea and sore throat. Respiratory:  Negative for cough and shortness of breath. Cardiovascular:  Negative for chest pain and leg swelling. Gastrointestinal:  Negative for abdominal pain, constipation, diarrhea, nausea and vomiting. Musculoskeletal:  Positive for arthralgias. Negative for back pain, neck pain and neck stiffness. Right shoulder, arm and elbow pain     Skin:  Negative for rash and wound. Neurological:  Negative for dizziness, light-headedness and headaches. Positives and Pertinent negatives as per HPI. Except as noted abovein the ROS, all other systems were reviewed and negative.        PAST MEDICAL HISTORY     Past Medical History:   Diagnosis Date    Depression     Diabetes mellitus ([facility] [address]     Hepatitis B surface antigen positive 11/22/2016    Neuropathy     Thyroid disease     Type II or unspecified type diabetes mellitus without mention of complication, not stated as uncontrolled          SURGICAL HISTORY     Past Surgical History:   Procedure Laterality Date    COLONOSCOPY  01/23/2017    polyp removal    TONSILLECTOMY      TONSILLECTOMY  age 10    TRIGGER FINGER RELEASE Right 1/17/2019    RIGHT THUMB TRIGGER DIGIT RELEASE performed by Charlee Hopkins MD at [place] [address]  12/08/2016    UPPER GASTROINTESTINAL ENDOSCOPY  01/23/2017         CURRENTMEDICATIONS       Previous Medications    INSULIN GLARGINE (LANTUS) 100 UNIT/ML INJECTION VIAL    Inject 10 Units into the skin nightly    NORTRIPTYLINE (PAMELOR) 50 MG CAPSULE Take 1 capsule by mouth nightly         ALLERGIES     Levaquin [levofloxacin], Penicillins, and Pcn [penicillins]    FAMILYHISTORY       Family History   Problem Relation Age of Onset    Diabetes Mother     High Blood Pressure Mother     Cancer Father     Diabetes Father     Lupus Sister     Cancer Sister     Heart Disease Brother           SOCIAL HISTORY       Social History     Socioeconomic History    Marital status: Single   Tobacco Use    Smoking status: Every Day     Packs/day: 1.00     Types: Cigarettes    Smokeless tobacco: Never   Substance and Sexual Activity    Alcohol use: Yes     Comment: occ    Drug use: Yes     Frequency: 3.0 times per week     Types: Marijuana (Weed)     Comment: last used 2 days ago    Sexual activity: Yes     Partners: Male   Social History Narrative    ** Merged History Encounter **            SCREENINGS    Tiffany Coma Scale  Eye Opening: Spontaneous  Best Verbal Response: Oriented  Best Motor Response: Obeys commands  [place] Coma Scale Score: 15        PHYSICAL EXAM    (up to 7 for level 4, 8 or more for level 5)     ED Triage Vitals [02/09/23 1620]   BP Temp Temp Source Heart Rate Resp SpO2 Height Weight   (!) 155/81 98.1 °F (36.7 °C) Oral 64 16 100 % 5' 10\" (1.778 m) 160 lb (72.6 kg)       Physical Exam  Vitals and nursing note reviewed. Constitutional:       Appearance: Normal appearance. She is not ill-appearing or diaphoretic. HENT:      Head: Normocephalic and atraumatic. Right Ear: External ear normal.      Left Ear: External ear normal.      Mouth/Throat:      Mouth: Mucous membranes are moist.      Pharynx: Oropharynx is clear. Eyes:      General:         Right eye: No discharge. Left eye: No discharge. Cardiovascular:      Rate and Rhythm: Normal rate and regular rhythm. Pulses: Normal pulses. Radial pulses are 2+ on the right side and 2+ on the left side. Heart sounds: Normal heart sounds. No murmur heard. No friction rub. No gallop. Pulmonary:      Effort: Pulmonary effort is normal. No respiratory distress. Breath sounds: Normal breath sounds. No stridor. No wheezing, rhonchi or rales. Chest:      Chest wall: No tenderness. Abdominal:      General: Abdomen is flat. Bowel sounds are normal.      Palpations: Abdomen is soft. Musculoskeletal:         General: Tenderness present. Right shoulder: Tenderness and bony tenderness present. No swelling, deformity, effusion, laceration or crepitus. Decreased range of motion. Normal strength. Normal pulse. Right upper arm: Tenderness and bony tenderness present. No swelling, edema, deformity or lacerations. Right elbow: No swelling, deformity, effusion or lacerations. Decreased range of motion. Tenderness present. Right forearm: Normal.      Right wrist: No swelling, deformity or effusion. Cervical back: Normal range of motion and neck supple. No rigidity or tenderness. Skin:     General: Skin is warm and dry. Capillary Refill: Capillary refill takes less than 2 seconds. Neurological:      General: No focal deficit present. Mental Status: She is alert and oriented to person, place, and time. Psychiatric:         Mood and Affect: Mood normal.         Behavior: Behavior normal.     PHYSICAL EXAM  BP (!) 155/81   Pulse 64   Temp 98.1 °F (36.7 °C) (Oral)   Resp 16   Ht 5' 10\" (1.778 m)   Wt 160 lb (72.6 kg)   LMP 12/06/2016 (Exact Date)   SpO2 100%   BMI 22.96 kg/m²     DIAGNOSTIC RESULTS   LABS:    Labs Reviewed - No data to display    All other labs were within normal range or not returned as of this dictation. EKG: All EKG's are interpreted by the Emergency Department Physician who either signs orCo-signs this chart in the absence of a cardiologist.  Please see their note for interpretation of EKG.       RADIOLOGY:   Non-plain film images such as CT, Ultrasound and MRI are read by the radiologist. Plain radiographic images are visualized andpreliminarily interpreted by the  ED Provider with the below findings:        Interpretation perthe Radiologist below, if available at the time of this note:    CT SHOULDER RIGHT WO CONTRAST   Final Result   1. Mildly displaced fracture involving the right humeral neck that extends   into the greater tuberosity. 2.  Nondisplaced fracture through the acromion process         XR HUMERUS RIGHT (MIN 2 VIEWS)   Final Result      Impacted fracture of the right humeral neck resulting in slight valgus   angulation. This appears to extend through the greater tuberosity. Humeral   head to glenoid alignment remains intact. XR ELBOW RIGHT (MIN 3 VIEWS)   Final Result      No acute findings in the right elbow. XR RADIUS ULNA RIGHT (2 VIEWS)   Final Result      No acute findings in the right forearm. XR WRIST RIGHT (MIN 3 VIEWS)   Final Result      No acute findings in the right wrist.         XR HAND RIGHT (MIN 3 VIEWS)   Final Result      No acute findings in the right hand. XR SHOULDER RIGHT (MIN 2 VIEWS)   Final Result   Oblique comminuted fracture proximal humerus      Nondisplaced fracture of the acromion           No results found.        PROCEDURES   Unless otherwise noted below, none     Procedures    CRITICAL CARE TIME   N/A    CONSULTS:  IP CONSULT TO ORTHOPEDIC SURGERY      EMERGENCY DEPARTMENT COURSE and DIFFERENTIALDIAGNOSIS/MDM:   Vitals:    Vitals:    02/09/23 1620   BP: (!) 155/81   Pulse: 64   Resp: 16   Temp: 98.1 °F (36.7 °C)   TempSrc: Oral   SpO2: 100%   Weight: 160 lb (72.6 kg)   Height: 5' 10\" (1.778 m)       Patient was given thefollowing medications:  Medications   lidocaine 4 % external patch 1 patch (1 patch TransDERmal Patch Applied 2/9/23 1936)   morphine sulfate (PF) injection 4 mg (4 mg IntraMUSCular Given 2/9/23 1813)       PDMP Monitoring:    Last PDMP Adalberto as Reviewed [facility]):  Review User Review Instant Review Result          Last Controlled Substance Monitoring Documentation      [address] Admission (Discharged) from 1/17/2019 in [facility] OR   Attestation The Prescription Monitoring Report for this patient was reviewed today. filed at 01/17/2019 1004   Periodic Controlled Substance Monitoring Possible medication side effects, risk of tolerance/dependence & alternative treatments discussed. filed at 01/17/2019 1004          Urine Drug Screenings (1 yr)       Drug screen multi urine  Collected: 1/2/2021  3:06 PM (Final result)   Narrative: Performed at:  [facility]) - [facility]  [address],  [place], [facility]   Phone [phone]             Drug screen multi urine  Collected: 5/28/2020  9:05 PM (Final result)   Narrative: Performed at:  [facility]) - [facility]  [address],  [place], [facility]   Phone [phone]                 Medication Contract and Consent for Opioid Use Documents Filed        No documents found                    MDM:   This patient was seen and evaluated by myself. She presents to the emergency department today status post mechanical fall. Although nursing note states that she lost her balance and fell on the steps the patient states that she tripped over the step and at no time was she dizzy or lightheaded. She is endorsing right-sided shoulder pain, she is right-hand dominant. On exam she is alert and oriented, hemodynamically stable and nontoxic in appearance. I discussed with her plan for evaluation in the emergency department including imaging and medications for pain and she was in agreement with this. She was medicated with an initial dose of p.o. Norco.  Images were evaluated and reviewed with the patient. X-ray right humerus interpreted by the radiologist and myself are impacted fracture of the right humeral neck resulting in slight valgus angulation.   Appears to extend through the greater tuberosity with the humeral head to the glenoid alignment remaining intact. No acute findings in the soft tissues. X-ray the right elbow interpreted by the radiologist and myself are no acute findings, no acute fracture, no dislocation  X-ray radius ulna right interpreted by the radiologist and myself are no acute findings, no fracture, no dislocation. X-ray right wrist interpreted by the radiologist and myself for no acute findings, no acute fracture, no dislocation. X-ray of the right hand interpreted by the radiologist and myself are no acute findings, no acute fracture, no dislocation. X-ray of the right shoulder interpreted by the radiologist and myself for an oblique comminuted fracture to the proximal humerus and a nondisplaced fracture of the acromion there also appears to be a fracture through the acromion and the visualized lung is unremarkable. A consult was placed to orthopedics and I did speak with Dr. Juliano Morrow who did review the images. He requested the patient be placed in a sling and swath and to obtain CT imaging and have her follow-up in their office. On reevaluation I communicated the findings of the radiology studies with the patient and her pain seemed more persistent, at that time she was medicated with 4 mg IM morphine and placed in the sling and swath as well as had a lidocaine patch applied. CT right shoulder without contrast interpreted by the radiologist myself for place fracture involving the right humeral neck and extends into the greater tuberosity. Nondisplaced fracture through the acromion process. Fragments are mildly displaced measuring less than 1 cm of separation. Humeral head remains normally articulated within the glenoid. Mild soft tissue edema about the fracture site.'s of the CT scan were also communicated with Dr. Juliano Morrow   Reevaluation patient stated that she had a significant improvement of her pain after being placed in the sling and medicated with morphine.   I discussed with her plan for discharge including following up with orthopedics, she was provided with both names for Dr. Kings Adhikari and Dr. Brittanie Palomo and instructed to call tomorrow to see when the earliest appointment would be. I did provide her with prescription for 3 days of Norco for breakthrough pain and instructed her on alternating Tylenol and ibuprofen around-the-clock along with lidocaine patches and ice. She did verbalize understanding of this. Her sensation is intact distal to the fracture and is continued to be intact throughout her emergency department visit. She denies any associated neck pain at this time and still continues to state that she did not strike her head with this fall. I do feel she is safe for discharge with appropriate pain management. She was instructed to keep her arm in the sling until she has been evaluated by orthopedics. She was provided with strict return precautions for the emergency department including but not limited to worsening pain, changes in sensation such as numbness or tingling, chest pain, shortness of breath or any other concerns. Patient verbalized understanding of all discharge teaching was ultimately discharged in a stable condition with all questions answered. Is this patient to be included in the SEP-1 Core Measure due to severe sepsis or septic shock? No, infection not suspected      Sepsis Criteria   Severe Sepsis Criteria   Septic Shock Criteria     Must be confirmed or suspected to move forward with diagnosis of sepsis. Must meet 2:    [] Temperature > 100.9 F (38.3 C)        or < 96.8 F (36 C)  [] HR > 90  [] RR > 20  [] WBC > 12 or < 4 or 10% bands      AND:      [] Infection Confirmed or        Suspected.      Must meet 1:    [] Lactate > 2       or   [] Signs of Organ Dysfunction:    - SBP < 90 or MAP < 65  - Altered mental status  - Creatinine > 2 or increased from      baseline  - Urine Output < 0.5 ml/kg/hr  - Bilirubin > 2  - INR > 1.5 (not anticoagulated)  - Platelets < 100,000  - Acute Respiratory Failure as     evidenced by new need for NIPPV     or mechanical ventilation      [] No criteria met for Severe Sepsis.   Must meet 1:    [] Lactate > 4        or   [] SBP < 90 or MAP < 65 for at        least two readings in the first        hour after fluid bolus        administration      [] Vasopressors initiated (if hypotension persists after fluid resuscitation)        [] No criteria met for Septic Shock.   Patient Vitals for the past 6 hrs:   BP Temp Pulse Resp SpO2 Height Weight Weight Method Percent Weight Change   02/09/23 1620 (!) 155/81 98.1 °F (36.7 °C) 64 16 100 % 5' 10\" (1.778 m) 160 lb (72.6 kg) Stated 0      No results for input(s): WBC, LACTA, CREATININE, BILITOT, INR, PLT in the last 72 hours.          Discharge Time out:  CC Reviewed Yes   Test Results Yes     Vitals:    02/09/23 1620   BP: (!) 155/81   Pulse: 64   Resp: 16   Temp: 98.1 °F (36.7 °C)   SpO2: 100%              FINAL IMPRESSION      1. Fx humeral neck, right, closed, initial encounter    2. Closed nondisplaced fracture of acromial process of right scapula, initial encounter          DISPOSITION/PLAN   DISPOSITION Decision To Discharge 02/09/2023 08:05:50 PM      PATIENT REFERREDTO:  Amber Henderson MD  [address]  [address]  [facility] [address]  [phone]    In 2 days  Re-evaluation    [facility] ED  [address]  [facility] [address] [phone]    As needed, If symptoms worsen    Edvin Man MD  [address] [facility] [address]  [phone]          Hubert Magdaleno MD  [address] [facility] [id number] [phone]          DISCHARGE MEDICATIONS:  New Prescriptions    HYDROCODONE-ACETAMINOPHEN (NORCO) 5-325 MG PER TABLET    Take 1 tablet by mouth every 6 hours as needed for Pain for up to 3 days. Intended supply: 3 days. Take lowest dose possible to manage pain Max Daily Amount: 4 tablets    LIDOCAINE 4 % EXTERNAL PATCH    Place 1 patch onto the  skin daily       DISCONTINUED MEDICATIONS:  Discontinued Medications    No medications on file              (Please note that portions ofthis note were completed with a voice recognition program.  Efforts were made to edit the dictations but occasionally words are mis-transcribed.)    ARACELI Lux CNP (electronically signed)        ARACELI Lux CNP  02/09/23 2005

## 2023-02-09 NOTE — ED NOTES
[facility] sent to DR. Shayla Moore  02/09/23 [id number] consult completed with call back from Dr. Inge Cruz speaking with Jabier Moyer, [address]  02/09/23 5500

## 2023-02-10 NOTE — DISCHARGE INSTRUCTIONS
The sling and swath until you have been reevaluated by orthopedics. Dr. Lc Jolley may have availability in his schedule on 2/10. Please call that office first.  If no availability please follow-up with Dr. Lea Long on Monday. Alternate Tylenol, ibuprofen and lidocaine patches for pain and utilize Norco for breakthrough pain.

## 2023-02-13 ENCOUNTER — OFFICE VISIT (OUTPATIENT)
Dept: ORTHOPEDIC SURGERY | Age: 58
End: 2023-02-13
Payer: MEDICAID

## 2023-02-13 VITALS — BODY MASS INDEX: 22.9 KG/M2 | HEIGHT: 70 IN | WEIGHT: 160 LBS

## 2023-02-13 DIAGNOSIS — S42.124A CLOSED NONDISPLACED FRACTURE OF ACROMIAL PROCESS OF RIGHT SCAPULA, INITIAL ENCOUNTER: ICD-10-CM

## 2023-02-13 DIAGNOSIS — M25.511 ACUTE PAIN OF RIGHT SHOULDER: ICD-10-CM

## 2023-02-13 DIAGNOSIS — S42.294A OTHER CLOSED NONDISPLACED FRACTURE OF PROXIMAL END OF RIGHT HUMERUS, INITIAL ENCOUNTER: Primary | ICD-10-CM

## 2023-02-13 PROCEDURE — G8484 FLU IMMUNIZE NO ADMIN: HCPCS | Performed by: ORTHOPAEDIC SURGERY

## 2023-02-13 PROCEDURE — 4004F PT TOBACCO SCREEN RCVD TLK: CPT | Performed by: ORTHOPAEDIC SURGERY

## 2023-02-13 PROCEDURE — 99204 OFFICE O/P NEW MOD 45 MIN: CPT | Performed by: ORTHOPAEDIC SURGERY

## 2023-02-13 PROCEDURE — 23600 CLTX PROX HUMRL FX W/O MNPJ: CPT | Performed by: ORTHOPAEDIC SURGERY

## 2023-02-13 PROCEDURE — G8420 CALC BMI NORM PARAMETERS: HCPCS | Performed by: ORTHOPAEDIC SURGERY

## 2023-02-13 PROCEDURE — 3017F COLORECTAL CA SCREEN DOC REV: CPT | Performed by: ORTHOPAEDIC SURGERY

## 2023-02-13 PROCEDURE — G8427 DOCREV CUR MEDS BY ELIG CLIN: HCPCS | Performed by: ORTHOPAEDIC SURGERY

## 2023-02-13 NOTE — PROGRESS NOTES
ORTHOPAEDIC SURGERY H&P / CONSULTATION NOTE    Chief complaint:   Chief Complaint   Patient presents with    Shoulder Pain     NP Rt shoulder        History of present illness: The patient is a 62 y.o. female right hand dominant with subjective symptoms of right shoulder pain. The chief complaint is located at lateral aspect of the right shoulder as well as superior based. Duration of symptoms has been for 4 days. The severity of symptoms is rated at 6/10 pain on intake form. Patient tripped on her rug and ultimately fell on outstretched arm having caught her self with her right arm. She went to the emergency room on 2/9/2023. She was found to have a nondisplaced proximal humerus fracture with varus impaction and an acromion fracture. She was placed in a sling and told to follow-up. She denies prior injury to the right shoulder. She is accompanied by her mother. She states that she smokes 1 pack a day. The patient has tried the below listed items prior to today's consultation for above listed chief complaint.     -   Over-the-counter anti-inflammatories/prescription medication anti-inflammatory.      -   Physical therapy / guided home exercise program -     -   Previous corticosteroid injections    Past medical history:    Past Medical History:   Diagnosis Date    Depression     Diabetes mellitus ([facility] [address]     Hepatitis B surface antigen positive 11/22/2016    Neuropathy     Thyroid disease     Type II or unspecified type diabetes mellitus without mention of complication, not stated as uncontrolled         Past surgical history:    Past Surgical History:   Procedure Laterality Date    COLONOSCOPY  01/23/2017    polyp removal    TONSILLECTOMY      TONSILLECTOMY  age 10    TRIGGER FINGER RELEASE Right 1/17/2019    RIGHT THUMB TRIGGER DIGIT RELEASE performed by Ashley Horton MD at [place] [address]  12/08/2016    UPPER GASTROINTESTINAL ENDOSCOPY  01/23/2017 Allergies: Allergies   Allergen Reactions    Levaquin [Levofloxacin]      Tendons locked up    Penicillins Hives    Pcn [Penicillins] Rash         Medications:   Current Outpatient Medications:     lidocaine 4 % external patch, Place 1 patch onto the skin daily, Disp: 30 patch, Rfl: 0    nortriptyline (PAMELOR) 50 MG capsule, Take 1 capsule by mouth nightly, Disp: 30 capsule, Rfl: 0    insulin glargine (LANTUS) 100 UNIT/ML injection vial, Inject 10 Units into the skin nightly, Disp: 1 vial, Rfl: 3     Social history: Denies IV drug use. Social History     Socioeconomic History    Marital status: Single     Spouse name: Not on file    Number of children: Not on file    Years of education: Not on file    Highest education level: Not on file   Occupational History    Not on file   Tobacco Use    Smoking status: Every Day     Packs/day: 1.00     Types: Cigarettes    Smokeless tobacco: Never   Substance and Sexual Activity    Alcohol use: Yes     Comment: occ    Drug use: Yes     Frequency: 3.0 times per week     Types: Marijuana (Weed)     Comment: last used 2 days ago    Sexual activity: Yes     Partners: Male   Other Topics Concern    Not on file   Social History Narrative    ** Merged History Encounter **          Social Determinants of Health     Financial Resource Strain: Not on file   Food Insecurity: Not on file   Transportation Needs: Not on file   Physical Activity: Not on file   Stress: Not on file   Social Connections: Not on file   Intimate Partner Violence: Not on file   Housing Stability: Not on file     Tobacco use. Social History     Tobacco Use   Smoking Status Every Day    Packs/day: 1.00    Types: Cigarettes   Smokeless Tobacco Never     Employment: Noncontributory    Workers compensation claim: Noncontributory    Review of systems: Patient denies any fevers chills chest pain shortness of breath nausea vomiting significant weight loss any change in voiding or bowel movements.  Patient denies any significant numbness or tingling at baseline as it relates to this presenting symptom/chief complaint. The patient denies any significant problems with skin or any significant allergies. Physical examination:  Body mass index is 22.96 kg/m². AAOx3, NCAT  EOMI  MMM  RR  Unlabored breathing, no wheezing  Skin intact BUE and BLE, warm and moist  Right shoulder examination deferred given known proximal humerus fracture. Positive tenderness palpation globally in the right shoulder, positive tenderness palpation along the superior aspect of the acromion. Skin intact throughout  5/5 G IO EPL  SILT Ax, R, U, M  +2 radial pulse    Diagnostic imaging:  MY READ:  3 view right shoulder 2/9/2023: Proximal humerus fracture with slight comminution and an impacted varus positioning. CT scan right shoulder 2/9/2023:  Impression   1. Mildly displaced fracture involving the right humeral neck that extends   into the greater tuberosity. 2.  Nondisplaced fracture through the acromion process         MY READ: Reduced glenohumeral joint albeit with very slightly comminuted varus impacted proximal humerus surgical neck fracture. There is also a nondisplaced acromion fracture through the mid body exiting through meso-meta    Pertinent lab work: Hemoglobin A1c not reported of recent but with prior documented elevated levels     Diagnosis Orders   1. Other closed nondisplaced fracture of proximal end of right humerus, initial encounter  OR CLTX PROXIMAL HUMERAL FRACTURE W/O MANIPULATION      2. Closed nondisplaced fracture of acromial process of right scapula, initial encounter  OR CLTX PROXIMAL HUMERAL FRACTURE W/O MANIPULATION      3.  Acute pain of right shoulder  OR CLTX PROXIMAL HUMERAL FRACTURE W/O MANIPULATION          Assessment and plan: 62 y.o. female with current subjective symptoms and physical exam findings with diagnostic imaging correlating to nondisplaced acromion fracture and nondisplaced impacted varus surgical neck proximal humerus fracture.  -Time of 23 minutes was spent coordinating and discussing the clinical findings, reviewing diagnostic imaging as indicated, coordinating care with prior notes review and current clinical encounter documentation as it pertains to the patient's presenting subjective symptoms and diagnoses.  -I reviewed with the patient the imaging findings as well as clinical exam and  how it correlates to subjective symptoms.  -Additional time was taken to review previous ER notes as well as prior documented advanced imaging and I reviewed these with the patient today.  Her glenohumeral joint is reduced.  At this time this fracture can be maintained in this position it can be managed nonoperatively.  -I reviewed with her smoking cessation she smokes 1 pack/day.  That is currently her biggest deterrent to healing this satisfactorily.  -I advocated for keeping her fingerstick blood glucose sugars tightly controlled  -She will follow-up in 1 week's time on 2/21/2023 for repeat 4 view right shoulder to include a Velpeau view instead of an axillary lateral remaining in the sling.  -She will continue nonweightbearing status and strict sling use right upper extremity.  -I reviewed with her the natural history evolution of healing of proximal humerus fractures as well as of acromion fractures which are a more rare injury.  Anticipate should this be able to be managed conservatively did not start formal physical therapy for P ROM/AA ROM till 4 weeks post injury and not start active range of motion until 6 weeks post injury given we need to allow for satisfactory healing not only the proximal humerus but more so also of the acromion.  In an effort to allow and address immobilization, we will go with a simple shoulder immobilizer without belly pillow so as to not move the proximal humerus fracture into more varus and yet have it cinched up enough to take weight off the acromion to allow it to heal  satisfactorily as well  -OTC Tylenol per bottle as needed discomfort and very sparing OTC Aleve per bottle as needed discomfort. She may use ice and heat as well. I advocated that she start to wean off the hydrocodone that was provided by the emergency room and transition to OTC medications as listed above  -All questions answered to the patient's satisfaction and the patient expressed understanding and agreement with the above listed treatment plan  -Follow up in 1 week's time as listed per the above  -Thank you for the clinical consultation and allowing me to participate in the patient's care. Electronically signed by Karoline Berrios MD on 2/13/23 at 4:30 PM EST         Karoline Berrios MD       Orthopaedic Surgery-Sports Medicine        Disclaimer: This note was dictated with voice recognition software. Though review and correction are routinely performed, please contact the office/medical records for any errors requiring correction.

## 2023-02-21 ENCOUNTER — OFFICE VISIT (OUTPATIENT)
Dept: ORTHOPEDIC SURGERY | Age: 58
End: 2023-02-21

## 2023-02-21 VITALS — HEIGHT: 70 IN | WEIGHT: 160 LBS | BODY MASS INDEX: 22.9 KG/M2

## 2023-02-21 DIAGNOSIS — M25.511 ACUTE PAIN OF RIGHT SHOULDER: ICD-10-CM

## 2023-02-21 DIAGNOSIS — S42.294D OTHER CLOSED NONDISPLACED FRACTURE OF PROXIMAL END OF RIGHT HUMERUS WITH ROUTINE HEALING, SUBSEQUENT ENCOUNTER: Primary | ICD-10-CM

## 2023-02-21 NOTE — PROGRESS NOTES
FOLLOW UP ORTHOPAEDIC NOTE    The patient follows up today for reevaluation of right proximal humerus fracture and acromion fracture. The patient states 5-6/10 pain. She feels that she has had a slight improvement in her baseline symptoms although still has discomfort. She has been nonweightbearing in the sling. She presents today for repeat radiographs and clinical examination    PE:  AAOx3  RR  Unlabored breathing  Skin warm and moist  Focused physical examination of the right shoulder  In the sling, still tenderness to palpation positive at the right proximal humerus as well as the acromion superiorly    Pertinent radiographs/imagin view right shoulder shows aligned glenohumeral joint. Differing radiographic view does show slight atony likely suspected of the deltoid with subtle inferior translation of the humeral head on AP imaging however this is still maintained in the glenohumeral joint. No change in acromion fracture position. Impacted humeral neck fracture     Diagnosis Orders   1. Other closed nondisplaced fracture of proximal end of right humerus with routine healing, subsequent encounter        2. Acute pain of right shoulder  XR SHOULDER RIGHT (MIN 2 VIEWS)        Assessment and plan: 62 female with continued subjective symptoms of right shoulder pain with known, correlating diagnosis of nondisplaced impacted right proximal humerus fracture and acromion fracture-nondisplaced  -Time of 12 minutes was spent coordinating and discussing the clinical findings and diagnostic imaging results as they pertain to the patient's presenting subjective symptoms.  -I do pleasant discussion with the patient today. We will continue nonweightbearing status and strict sling/shoulder immobilizer use.  -I advocated for smoking cessation again today.  -She will follow-up in 1 week's time for repeat 4 view to include Velpeau view substitute instead of axillary lateral and with known fracture.   At this time there is no significant gross displacement so we will continue to manage this conservatively/nonoperatively  -All questions answered to the patient's satisfaction and the patient expressed understanding and agreement with the above listed treatment plan  -Follow up in 1 week per the above 4 view nonweightbearing right shoulder substituted with Velpeau view  -Thank you for the clinical consultation and allowing me to participate in the patient's care. Electronically signed by Sulema Su MD on 2/21/23 at 5:27 PM SHO Su MD       Orthopaedic Surgery-Sports Medicine    Disclaimer: This note was dictated with voice recognition software. Though review and correction are routinely performed, please contact the office/medical records for any errors requiring correction.

## 2023-03-02 ENCOUNTER — OFFICE VISIT (OUTPATIENT)
Dept: ORTHOPEDIC SURGERY | Age: 58
End: 2023-03-02

## 2023-03-02 VITALS — WEIGHT: 160 LBS | HEIGHT: 70 IN | BODY MASS INDEX: 22.9 KG/M2

## 2023-03-02 DIAGNOSIS — S42.294D OTHER CLOSED NONDISPLACED FRACTURE OF PROXIMAL END OF RIGHT HUMERUS WITH ROUTINE HEALING, SUBSEQUENT ENCOUNTER: Primary | ICD-10-CM

## 2023-03-02 NOTE — PROGRESS NOTES
FOLLOW UP ORTHOPAEDIC NOTE    The patient follows up today for reevaluation of right shoulder. The patient states 1/10 pain at rest but when she does move it 5/10 pain. She only moves it when she is donning doffing clothing and for hygiene. She feels that it is getting better. Patient states that she still smoking    PE:  AAOx3  RR  Unlabored breathing  Skin warm and moist  Focused physical examination of the right shoulder  Positive tenderness palpation proximal humerus albeit improved. Pertinent radiographs/imagin view right shoulder 3/2/2023: Unchanged appearance of right proximal humerus surgical neck fracture. Unchanged translation. Reduced glenohumeral joint. Diagnosis Orders   1. Other closed nondisplaced fracture of proximal end of right humerus with routine healing, subsequent encounter  XR SHOULDER RIGHT (MIN 2 VIEWS)    Ambulatory referral to Physical Therapy        Assessment and plan: 62 female with continued subjective symptoms of right shoulder pain with known, correlating diagnosis of right shoulder nondisplaced proximal humerus fracture. -Time of 16 minutes was spent coordinating and discussing the clinical findings and diagnostic imaging results as they pertain to the patient's presenting subjective symptoms.  -I had a pleasant discussion with the patient today. I reviewed with her directly her images. At this time we will continue conservative care with nonoperative management. This is an impacted proximal humerus surgical neck fracture. The glenohumeral joint is reduced.  -She states that she is still smoking. I advocated for smoking cessation. She honestly told me she will quit on multiple stressors in her life. I did review with her the potential risk for delayed union/nonunion. She expressed understanding  -Patient will start physical therapy around the 4-week point on 2023.   She will follow-up 3 weeks from now for a 6-week post injury visit where 4 view right shoulder will be obtained to assess for adequate continued healing.  -Continue nonweightbearing status and sling use for total of 6 weeks post injury until she follows up to see me  -Formal physical therapy has been prescribed. Again she will not start until 4/13/2023  -All questions answered to the patient's satisfaction and the patient expressed understanding and agreement with the above listed treatment plan  -Follow up in 3 weeks time for a 6-week post injury visit. -Thank you for the clinical consultation and allowing me to participate in the patient's care. Electronically signed by Ana Laura Friedman MD on 3/2/23 at 1:55 PM SHO Friedman MD       Orthopaedic Surgery-Sports Medicine    Disclaimer: This note was dictated with voice recognition software. Though review and correction are routinely performed, please contact the office/medical records for any errors requiring correction.

## 2023-03-13 ENCOUNTER — HOSPITAL ENCOUNTER (OUTPATIENT)
Dept: PHYSICAL THERAPY | Age: 58
Setting detail: THERAPIES SERIES
Discharge: HOME | End: 2023-03-13
Payer: MEDICAID

## 2023-03-13 PROCEDURE — 97140 MANUAL THERAPY 1/> REGIONS: CPT | Performed by: PHYSICAL THERAPIST

## 2023-03-13 PROCEDURE — 97110 THERAPEUTIC EXERCISES: CPT | Performed by: PHYSICAL THERAPIST

## 2023-03-13 PROCEDURE — 97161 PT EVAL LOW COMPLEX 20 MIN: CPT | Performed by: PHYSICAL THERAPIST

## 2023-03-13 NOTE — FLOWSHEET NOTE
[place] [address] [facility],  [address] [place]  Phone: [phone]  Fax [phone]        Date:  3/13/2023    Patient Name:  Pat Wright    :  1965  MRN: [id number]  Restrictions/Precautions:    Medical/Treatment Diagnosis Information:  Other closed nondisplaced fracture of proximal end of right humerus with routine healing, subsequent encounter [S42.366D]  Right shoulder pain M25.511          Insurance/Certification information:  PT Insurance Information: Unknown [employer]  Physician Information:  Lakesha Curtis MD  Has the plan of care been signed (Y/N):        []  Yes  [x]  No     Date of Patient follow up with Physician: 6 weeks s/p injury      Is this a Progress Report:     []  Yes  [x]  No        If Yes:  Date Range for reporting period:  Beginning: 3/13/23  Ending:     Progress report will be due (10 Rx or 30 days whichever is less):        Recertification will be due (POC Duration  / 90 days whichever is less):       Visit # Insurance Allowable Auth Required   In-person 1 30 []  Yes []  No    Telehealth   []  Yes []  No    Total        Therapy Diagnosis/Practice Pattern:G      Number of Comorbidities:  []0     [x]1-2    []3+    Latex Allergy:  [x]NO      []YES  Preferred Language for Healthcare:   [x]English       []other:    SUBJECTIVE:  See eval    OBJECTIVE: See eval  Observation:   Test measurements:    ROM Right  3/13/23 current   Shoulder Flex     Shoulder Abd     Shoulder ER 20    Shoulder IR     Elbow flex 150    Elbow ext 15         Strength  Right    Shoulder Flex N/t    Shoulder Scap N/t    Shoulder ER N/t    Shoulder IR N/t         Quick Dash     Pain Scale 0-3/10      RESTRICTIONS/PRECAUTIONS: DM, anxiety/ depression    Exercises/Interventions:     Therapeutic Ex (56645) Sets/sec Reps Notes/CUES         SBS :05 X 10 hep   Shrug  :05 X 10 hep   Passive ER  X 10 hep   Active elbow flex/ ext standing   X 10 hep   pendulum  X 10 hep                                       Manual Intervention ([id number])      PROM  shoulder and elbow. 8 min                                  NMR re-education (64251)   CUES NEEDED                                                         Therapeutic Activity (33114)                                                Therapeutic Exercise and NMR EXR  [x] (38988) Provided verbal/tactile cueing for activities related to strengthening, flexibility, endurance, ROM  for improvements in scapular, scapulothoracic and UE control with self care, reaching, carrying, lifting, house/yardwork, driving/computer work.    [] (80154) Provided verbal/tactile cueing for activities related to improving balance, coordination, kinesthetic sense, posture, motor skill, proprioception  to assist with  scapular, scapulothoracic and UE control with self care, reaching, carrying, lifting, house/yardwork, driving/computer work. Therapeutic Activities:    [] (46099 or 43742) Provided verbal/tactile cueing for activities related to improving balance, coordination, kinesthetic sense, posture, motor skill, proprioception and motor activation to allow for proper function of scapular, scapulothoracic and UE control with self care, carrying, lifting, driving/computer work.      Home Exercise Program:    [x] (17488) Reviewed/Progressed HEP activities related to strengthening, flexibility, endurance, ROM of scapular, scapulothoracic and UE control with self care, reaching, carrying, lifting, house/yardwork, driving/computer work  [] (55611) Reviewed/Progressed HEP activities related to improving balance, coordination, kinesthetic sense, posture, motor skill, proprioception of scapular, scapulothoracic and UE control with self care, reaching, carrying, lifting, house/yardwork, driving/computer work      Manual Treatments:  PROM / STM / Oscillations-Mobs:  G-I, II, III, IV (PA's, Inf., Post.)  [x] (54048) Provided manual therapy to mobilize soft tissue/joints of cervical/CT, scapular GHJ and UE for the purpose of modulating pain, promoting relaxation,  increasing ROM, reducing/eliminating soft tissue swelling/inflammation/restriction, improving soft tissue extensibility and allowing for proper ROM for normal function with self care, reaching, carrying, lifting, house/yardwork, driving/computer work    Modalities:      Charges  Timed Code Treatment Minutes: 28   Total Treatment Minutes: 45     [x] EVAL (LOW) 61597   [] EVAL (MOD) 71387   [] EVAL (HIGH) 94896   [] RE-EVAL     [x] ZV(22975) x  1   [] IONTO  [] NMR (76653) x     [] VASO  [x] Manual (26329) x 1     [] Other:  [] TA x      [] Mech Traction (17921)  [] ES(attended) (85486)      [] ES (un) (67450):     GOALS:  Patient stated goal: Return movement to shoulder  [] Progressing: [] Met: [] Not Met: [] Adjusted    Therapist goals for Patient:   Short Term Goals: To be achieved in: 2 weeks  1. Independent in HEP and progression per patient tolerance, in order to prevent re-injury. [] Progressing: [] Met: [] Not Met: [] Adjusted  2. Patient will have a decrease in pain to facilitate improvement in movement, function, and ADLs as indicated by Functional Deficits. [] Progressing: [] Met: [] Not Met: [] Adjusted    Long Term Goals: To be achieved in: 8 weeks  1. Disability index score of 15% or less per Duanne Cue to assist with reaching prior level of function. [] Progressing: [] Met: [] Not Met: [] Adjusted  2. Patient will demonstrate increased AROM to 150 flex, 90 Er, 70 IR to allow for proper joint functioning as indicated by patients Functional Deficits. [] Progressing: [] Met: [] Not Met: [] Adjusted  3. Patient will demonstrate an increase in Strength to 4/5 throughout right UE to allow for proper functional mobility as indicated by patients Functional Deficits. [] Progressing: [] Met: [] Not Met: [] Adjusted  4.  Patient will return to dressing, showering, and styling hair without increased symptoms or restriction. [] Progressing: [] Met: [] Not Met: [] Adjusted  5. Ab;e to clean, cook, and perform other household chores with right UE  [] Progressing: [] Met: [] Not Met: [] Adjusted     Progression Towards Functional goals:  [] Patient is progressing as expected towards functional goals listed. [] Progression is slowed due to complexities listed. [] Progression has been slowed due to co-morbidities. [x] Plan just implemented, too soon to assess goals progression  [] Other:     ASSESSMENT:  See eval    Overall Progression Towards Functional goals/ Treatment Progress Update:  [] Patient is progressing as expected towards functional goals listed. [] Progression is slowed due to complexities/Impairments listed. [] Progression has been slowed due to co-morbidities. [x] Plan just implemented, too soon to assess goals progression <30days   [] Goals require adjustment due to lack of progress  [] Patient is not progressing as expected and requires additional follow up with physician  [] Other    Prognosis for POC: [x] Good [] Fair  [] Poor      Patient requires continued skilled intervention: [x] Yes  [] No    Treatment/Activity Tolerance:  [x] Patient able to complete treatment  [] Patient limited by fatigue  [] Patient limited by pain    [] Patient limited by other medical complications  [] Other:         PLAN: See eval  [] Continue per plan of care [] Alter current plan (see comments above)  [x] Plan of care initiated [] Hold pending MD visit [] Discharge      Electronically signed by:  Evens Martino, PT    Note: If patient does not return for scheduled/ recommended follow up visits, this note will serve as a discharge from care along with most recent update on progress.
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
## 2023-03-20 ENCOUNTER — HOSPITAL ENCOUNTER (OUTPATIENT)
Dept: PHYSICAL THERAPY | Age: 58
Setting detail: THERAPIES SERIES
Discharge: HOME | End: 2023-03-20
Payer: MEDICAID

## 2023-03-20 NOTE — FLOWSHEET NOTE
[facility],  [address]        Physical Therapy  Cancellation/No-show Note  Patient Name:  Erich Sanchez  :  1965   Date:  3/20/2023  Cancelled visits to date: 0  No-shows to date: 1    For today's appointment patient:  []  Cancelled  []  Rescheduled appointment  [x]  No-show     Reason given by patient:  []  Patient ill  []  Conflicting appointment  []  No transportation    []  Conflict with work  []  No reason given  []  Other:     Comments:      Electronically signed by:  Jef Neff, PT

## 2023-03-21 ENCOUNTER — HOSPITAL ENCOUNTER (OUTPATIENT)
Dept: PHYSICAL THERAPY | Age: 58
Setting detail: THERAPIES SERIES
Discharge: HOME | End: 2023-03-21
Payer: MEDICAID

## 2023-03-21 PROCEDURE — 97140 MANUAL THERAPY 1/> REGIONS: CPT | Performed by: PHYSICAL THERAPIST

## 2023-03-21 PROCEDURE — 97110 THERAPEUTIC EXERCISES: CPT | Performed by: PHYSICAL THERAPIST

## 2023-03-21 NOTE — FLOWSHEET NOTE
me    Exercises/Interventions:     Therapeutic Ex (97603) Sets/sec Reps Notes/CUES         SBS :05 X 10 hep   Shrug  :05 X 10 hep   Cane ER  X 10 hep   Active elbow flex/ ext standing   X 10 hep   pendulum  X 10 hep   Table slide  X 10                                   Manual Intervention (36537)      PROM  shoulder and elbow. 8 min    SL scap mobs  5 min                NMR re-education (27238)   CUES NEEDED                           Therapeutic Activity (04657)                        Therapeutic Exercise and NMR EXR  [x] (90285) Provided verbal/tactile cueing for activities related to strengthening, flexibility, endurance, ROM  for improvements in scapular, scapulothoracic and UE control with self care, reaching, carrying, lifting, house/yardwork, driving/computer work.    [] (70365) Provided verbal/tactile cueing for activities related to improving balance, coordination, kinesthetic sense, posture, motor skill, proprioception  to assist with  scapular, scapulothoracic and UE control with self care, reaching, carrying, lifting, house/yardwork, driving/computer work. Therapeutic Activities:    [] (61178 or 25702) Provided verbal/tactile cueing for activities related to improving balance, coordination, kinesthetic sense, posture, motor skill, proprioception and motor activation to allow for proper function of scapular, scapulothoracic and UE control with self care, carrying, lifting, driving/computer work.      Home Exercise Program:    [x] (47468) Reviewed/Progressed HEP activities related to strengthening, flexibility, endurance, ROM of scapular, scapulothoracic and UE control with self care, reaching, carrying, lifting, house/yardwork, driving/computer work  [] (27579) Reviewed/Progressed HEP activities related to improving balance, coordination, kinesthetic sense, posture, motor skill, proprioception of scapular, scapulothoracic and UE control with self care, reaching, carrying, lifting, house/yardwork,

## 2023-03-27 ENCOUNTER — HOSPITAL ENCOUNTER (OUTPATIENT)
Dept: PHYSICAL THERAPY | Age: 58
Setting detail: THERAPIES SERIES
Discharge: HOME | End: 2023-03-27
Payer: MEDICAID

## 2023-03-27 PROCEDURE — 97140 MANUAL THERAPY 1/> REGIONS: CPT | Performed by: PHYSICAL THERAPIST

## 2023-03-27 PROCEDURE — 97110 THERAPEUTIC EXERCISES: CPT | Performed by: PHYSICAL THERAPIST

## 2023-03-27 NOTE — FLOWSHEET NOTE
[facility],  [address] [place]  Phone: [phone]  Fax [phone]        Date:  3/27/2023    Patient Name:  Tavon Lozada    :  1965  MRN: [id number]  Restrictions/Precautions:    Medical/Treatment Diagnosis Information:  Other closed nondisplaced fracture of proximal end of right humerus with routine healing, subsequent encounter [S42.294D]  DOI: 23  Right shoulder pain M25.511          Insurance/Certification information:  PT Insurance Information: Kip Valentino  Physician Information:  Enoc Evans MD  Has the plan of care been signed (Y/N):        []  Yes  [x]  No     Date of Patient follow up with Physician: 3/24/23      Is this a Progress Report:     []  Yes  [x]  No        If Yes:  Date Range for reporting period:  Beginning: 3/13/23  Ending:     Progress report will be due (10 Rx or 30 days whichever is less): 95       Recertification will be due (POC Duration  / 90 days whichever is less):       Visit # Insurance Allowable Auth Required   In-person 3 30 []  Yes []  No    Telehealth   []  Yes []  No    Total        Therapy Diagnosis/Practice Pattern:G      Number of Comorbidities:  []0     [x]1-2    []3+    Latex Allergy:  [x]NO      []YES  Preferred Language for Healthcare:   [x]English       []other:    SUBJECTIVE:  Pt is 6.5 weeks post injury. Pt missed her apptment with Doc on Friday. Has not rescheduled yet. Pt rates pain 2/10.        OBJECTIVE: See eval  Observation:   Test measurements:    ROM Right  3/13/23 current   Shoulder Flex  134   Shoulder Abd     Shoulder ER 20 41   Shoulder IR     Elbow flex 150    Elbow ext 15 5        Strength  Right    Shoulder Flex N/t    Shoulder Scap N/t    Shoulder ER N/t    Shoulder IR N/t         Quick Dash 36    Pain Scale 0-3/10 1/10     RESTRICTIONS/PRECAUTIONS: DM, anxiety/ depression  3/2/23: Continue NWB status and sling use for total of 6

## 2023-03-30 ENCOUNTER — OFFICE VISIT (OUTPATIENT)
Dept: ORTHOPEDIC SURGERY | Age: 58
End: 2023-03-30

## 2023-03-30 VITALS — WEIGHT: 160 LBS | BODY MASS INDEX: 22.9 KG/M2 | HEIGHT: 70 IN

## 2023-03-30 DIAGNOSIS — S42.294D OTHER CLOSED NONDISPLACED FRACTURE OF PROXIMAL END OF RIGHT HUMERUS WITH ROUTINE HEALING, SUBSEQUENT ENCOUNTER: Primary | ICD-10-CM

## 2023-03-30 DIAGNOSIS — M25.511 ACUTE PAIN OF RIGHT SHOULDER: ICD-10-CM

## 2023-03-30 NOTE — PROGRESS NOTES
encouraging.  -Follow-up in 6 weeks time for 3-month post injury visit for clinical examination only  -All questions answered to the patient's satisfaction and the patient expressed understanding and agreement with the above listed treatment plan  -Follow up in 6 weeks per the above  -Thank you for the clinical consultation and allowing me to participate in the patient's care. Electronically signed by Weston Avila MD on 3/30/23 at 9:05 AM EDGLEN Avila MD       Orthopaedic Surgery-Sports Medicine    Disclaimer: This note was dictated with voice recognition software. Though review and correction are routinely performed, please contact the office/medical records for any errors requiring correction.

## 2023-04-03 ENCOUNTER — HOSPITAL ENCOUNTER (OUTPATIENT)
Dept: PHYSICAL THERAPY | Age: 58
Setting detail: THERAPIES SERIES
Discharge: HOME | End: 2023-04-03
Payer: MEDICAID

## 2023-04-03 PROCEDURE — 97110 THERAPEUTIC EXERCISES: CPT | Performed by: PHYSICAL THERAPIST

## 2023-04-03 PROCEDURE — 97140 MANUAL THERAPY 1/> REGIONS: CPT | Performed by: PHYSICAL THERAPIST

## 2023-04-03 NOTE — FLOWSHEET NOTE
[facility], [address] [place]  Phone: [phone]  Fax [phone]        Date:  4/3/2023    Patient Name:  Elsie Chun    :  1965  MRN: [id number]  Restrictions/Precautions:    Medical/Treatment Diagnosis Information:  Other closed nondisplaced fracture of proximal end of right humerus with routine healing, subsequent encounter [J01.454A]  DOI: 23  Right shoulder pain M25.511          Insurance/Certification information:  PT Insurance Information: Alondra Clark  Physician Information:  Vanita Moon MD  Has the plan of care been signed (Y/N):        []  Yes  [x]  No     Date of Patient follow up with Physician: 3/24/23      Is this a Progress Report:     []  Yes  [x]  No        If Yes:  Date Range for reporting period:  Beginning: 3/13/23  Ending:     Progress report will be due (10 Rx or 30 days whichever is less): 3/91/79       Recertification will be due (POC Duration  / 90 days whichever is less):       Visit # Insurance Allowable Auth Required   In-person 4 30 []  Yes []  No    Telehealth   []  Yes []  No    Total        Therapy Diagnosis/Practice Pattern:G      Number of Comorbidities:  []0     [x]1-2    []3+    Latex Allergy:  [x]NO      []YES  Preferred Language for Healthcare:   [x]English       []other:    SUBJECTIVE:  Pt is 7 weeks post injury. Pt has been released from sling. Pt reports that eating and dressing are getting easier.      OBJECTIVE: See eval  Observation:   Test measurements:    ROM Right  3/13/23 current   Shoulder Flex  134   Shoulder Abd     Shoulder ER 20 41   Shoulder IR     Elbow flex 150    Elbow ext 15 5        Strength  Right    Shoulder Flex N/t    Shoulder Scap N/t    Shoulder ER N/t    Shoulder IR N/t         Quick Dash 36    Pain Scale 0-3/10 1/10     RESTRICTIONS/PRECAUTIONS: DM, anxiety/ depression  3/2/23: Continue NWB status and sling use for total of 6 weeks

## 2023-04-17 ENCOUNTER — HOSPITAL ENCOUNTER (OUTPATIENT)
Dept: PHYSICAL THERAPY | Age: 58
Setting detail: THERAPIES SERIES
Discharge: HOME | End: 2023-04-17
Payer: MEDICAID

## 2023-04-17 NOTE — FLOWSHEET NOTE
[facility],  [address]        Physical Therapy  Cancellation/No-show Note  Patient Name:  Thelma Wooten  :  1965   Date:  2023  Cancelled visits to date:1  No-shows to date: 1    For today's appointment patient:  [x]  Cancelled  []  Rescheduled appointment  []  No-show     Reason given by patient:  []  Patient ill  []  Conflicting appointment  []  No transportation    []  Conflict with work  [x]  No reason given  []  Other:     Comments:      Electronically signed by:  Fannie Fuentes PT

## 2023-04-24 ENCOUNTER — HOSPITAL ENCOUNTER (OUTPATIENT)
Dept: PHYSICAL THERAPY | Age: 58
Setting detail: THERAPIES SERIES
Discharge: HOME | End: 2023-04-24
Payer: MEDICAID

## 2023-04-24 NOTE — FLOWSHEET NOTE
[facility],  [address]        Physical Therapy  Cancellation/No-show Note  Patient Name:  Chandler Armando  :  1965   Date:  2023  Cancelled visits to date:1  No-shows to date: 2    For today's appointment patient:  []  Cancelled  []  Rescheduled appointment  [x]  No-show     Reason given by patient:  []  Patient ill  []  Conflicting appointment  []  No transportation    []  Conflict with work  []  No reason given  []  Other:     Comments:      Electronically signed by:  Prince Melissa PT

## 2023-05-11 ENCOUNTER — OFFICE VISIT (OUTPATIENT)
Dept: ORTHOPEDIC SURGERY | Age: 58
End: 2023-05-11
Payer: MEDICAID

## 2023-05-11 VITALS — HEIGHT: 70 IN | BODY MASS INDEX: 22.9 KG/M2 | WEIGHT: 160 LBS

## 2023-05-11 DIAGNOSIS — S42.294D OTHER CLOSED NONDISPLACED FRACTURE OF PROXIMAL END OF RIGHT HUMERUS WITH ROUTINE HEALING, SUBSEQUENT ENCOUNTER: Primary | ICD-10-CM

## 2023-05-11 PROCEDURE — 3017F COLORECTAL CA SCREEN DOC REV: CPT | Performed by: ORTHOPAEDIC SURGERY

## 2023-05-11 PROCEDURE — 4004F PT TOBACCO SCREEN RCVD TLK: CPT | Performed by: ORTHOPAEDIC SURGERY

## 2023-05-11 PROCEDURE — 99213 OFFICE O/P EST LOW 20 MIN: CPT | Performed by: ORTHOPAEDIC SURGERY

## 2023-05-11 PROCEDURE — G8427 DOCREV CUR MEDS BY ELIG CLIN: HCPCS | Performed by: ORTHOPAEDIC SURGERY

## 2023-05-11 PROCEDURE — G8420 CALC BMI NORM PARAMETERS: HCPCS | Performed by: ORTHOPAEDIC SURGERY

## 2023-05-11 NOTE — PROGRESS NOTES
FOLLOW UP ORTHOPAEDIC NOTE    The patient follows up today for reevaluation of right shoulder possible humerus fracture. The patient states 4/10 pain at its worst but otherwise is 1-2/10 pain. She had completed physical therapy for several visits and she is pleased with the results. She only notices 4/10 pain with discomfort towards the lateral aspect of the proximal arm over the last week. She denies repeat injury. She is otherwise pleased with the results of nonoperative care. PE:  AAOx3  RR  Unlabored breathing  Skin warm and moist  Focused physical examination of the right shoulder  170 degrees forward flexion abduction 60 degrees external rotation internal rotation to L2  5/5 rotator cuff testing throughout, nontender to palpation biceps tendon and nontender to palpation acromioclavicular joint  Neurovascular exam unchanged    Pertinent radiographs/imagin view right shoulder 2023: Healed proximal humerus fracture. Aligned glenohumeral joint. Diagnosis Orders   1. Other closed nondisplaced fracture of proximal end of right humerus with routine healing, subsequent encounter  XR SHOULDER RIGHT (MIN 2 VIEWS)        Assessment and plan: 62 female with continued subjective symptoms of right shoulder pain with known, correlating diagnosis of healed right proximal humerus fracture. -Time of 16 minutes was spent coordinating and discussing the clinical findings and diagnostic imaging results as they pertain to the patient's presenting subjective symptoms.  -I had a pleasant discussion with the patient today. I reviewed with her that currently her clinical examination is well-appearing. Her range of motion is functionally improved.   -OTC Tylenol per bottle as needed discomfort  -Continue physical therapy she had been taught as a home exercise program  -I advocated for activity modification as needed  -Should she still have pain in 6 to 8 weeks time she will contact the office for repeat

## (undated) DEVICE — GLOVE SURG SZ 7 L12IN FNGR THK94MIL STD WHT ISOLEX LTX FREE

## (undated) DEVICE — STERILE LATEX POWDER-FREE SURGICAL GLOVESWITH NITRILE COATING: Brand: PROTEXIS

## (undated) DEVICE — NEEDLE HYPO 22GA L1 1/2IN PIVOTING SHLD FOR LUERLOCK SYR

## (undated) DEVICE — SYRINGE MED 10ML LUERLOCK TIP W/O SFTY DISP

## (undated) DEVICE — BLADE OPHTH 180DEG CUT SURF BLU STR SHRP DBL BVL GRINDLESS

## (undated) DEVICE — Z CONVERTED USE 2273163 BANDAGE COMPR W3INXL4.5YD LTWT E EC SGL LAYERED CLP CLSR

## (undated) DEVICE — SUTURE ETHLN SZ 4-0 L18IN NONABSORBABLE BLK L19MM PS-2 3/8 1667H

## (undated) DEVICE — COTTON UNDERCAST PADDING,CRIMPED FINISH: Brand: WEBRIL

## (undated) DEVICE — NEEDLE HYPO 18GA L1.5IN THN WALL PIVOTING SHLD BVL ORIENTED